# Patient Record
Sex: MALE | Race: WHITE | NOT HISPANIC OR LATINO
[De-identification: names, ages, dates, MRNs, and addresses within clinical notes are randomized per-mention and may not be internally consistent; named-entity substitution may affect disease eponyms.]

---

## 2017-05-10 ENCOUNTER — APPOINTMENT (OUTPATIENT)
Dept: COLORECTAL SURGERY | Facility: CLINIC | Age: 27
End: 2017-05-10

## 2017-05-10 VITALS
HEIGHT: 68 IN | SYSTOLIC BLOOD PRESSURE: 137 MMHG | HEART RATE: 87 BPM | WEIGHT: 180 LBS | RESPIRATION RATE: 14 BRPM | BODY MASS INDEX: 27.28 KG/M2 | DIASTOLIC BLOOD PRESSURE: 83 MMHG | TEMPERATURE: 98.1 F

## 2017-05-10 DIAGNOSIS — Z86.69 PERSONAL HISTORY OF OTHER DISEASES OF THE NERVOUS SYSTEM AND SENSE ORGANS: ICD-10-CM

## 2017-05-10 DIAGNOSIS — Z87.2 PERSONAL HISTORY OF DISEASES OF THE SKIN AND SUBCUTANEOUS TISSUE: ICD-10-CM

## 2017-05-10 DIAGNOSIS — Z80.9 FAMILY HISTORY OF MALIGNANT NEOPLASM, UNSPECIFIED: ICD-10-CM

## 2017-05-10 DIAGNOSIS — K60.2 ANAL FISSURE, UNSPECIFIED: ICD-10-CM

## 2017-05-10 DIAGNOSIS — Z87.891 PERSONAL HISTORY OF NICOTINE DEPENDENCE: ICD-10-CM

## 2017-05-10 RX ORDER — NITROGLYCERIN 4 MG/G
0.4 OINTMENT RECTAL
Refills: 0 | Status: ACTIVE | COMMUNITY

## 2017-07-18 ENCOUNTER — APPOINTMENT (OUTPATIENT)
Dept: COLORECTAL SURGERY | Facility: HOSPITAL | Age: 27
End: 2017-07-18

## 2017-10-06 ENCOUNTER — OUTPATIENT (OUTPATIENT)
Dept: OUTPATIENT SERVICES | Facility: HOSPITAL | Age: 27
LOS: 1 days | End: 2017-10-06
Payer: COMMERCIAL

## 2017-10-06 VITALS
RESPIRATION RATE: 16 BRPM | HEIGHT: 67 IN | HEART RATE: 66 BPM | OXYGEN SATURATION: 97 % | SYSTOLIC BLOOD PRESSURE: 101 MMHG | TEMPERATURE: 99 F | DIASTOLIC BLOOD PRESSURE: 64 MMHG | WEIGHT: 177.91 LBS

## 2017-10-06 DIAGNOSIS — Z01.818 ENCOUNTER FOR OTHER PREPROCEDURAL EXAMINATION: ICD-10-CM

## 2017-10-06 DIAGNOSIS — K60.0 ACUTE ANAL FISSURE: ICD-10-CM

## 2017-10-06 PROCEDURE — 85027 COMPLETE CBC AUTOMATED: CPT

## 2017-10-06 PROCEDURE — G0463: CPT

## 2017-10-06 RX ORDER — SODIUM CHLORIDE 9 MG/ML
3 INJECTION INTRAMUSCULAR; INTRAVENOUS; SUBCUTANEOUS EVERY 8 HOURS
Qty: 0 | Refills: 0 | Status: DISCONTINUED | OUTPATIENT
Start: 2017-10-12 | End: 2017-10-27

## 2017-10-06 RX ORDER — LIDOCAINE HCL 20 MG/ML
0.2 VIAL (ML) INJECTION ONCE
Qty: 0 | Refills: 0 | Status: DISCONTINUED | OUTPATIENT
Start: 2017-10-12 | End: 2017-10-27

## 2017-10-06 NOTE — H&P PST ADULT - NSANTHOSAYNRD_GEN_A_CORE
No. NAA screening performed.  STOP BANG Legend: 0-2 = LOW Risk; 3-4 = INTERMEDIATE Risk; 5-8 = HIGH Risk

## 2017-10-06 NOTE — H&P PST ADULT - PMH
Acute anal fissure    Herpes zoster without complication  dx 9/2017  antivirals 9/29/17-10/6/17  Hyperlipidemia, unspecified hyperlipidemia type  diet management

## 2017-10-06 NOTE — H&P PST ADULT - HISTORY OF PRESENT ILLNESS
26 yr old male with history of anal fissure for surgery.  ****Was diagnosed with Shingles treated with Valtrex last dose 10/6/17.   Dry crusted lesions left abdomen. ****

## 2017-10-06 NOTE — H&P PST ADULT - FAMILY HISTORY
Father  Still living? No  Family history of early CAD, Age at diagnosis: 41-50     Grandparent  Still living? No  Family history of early CAD, Age at diagnosis: 51-60

## 2017-10-12 ENCOUNTER — TRANSCRIPTION ENCOUNTER (OUTPATIENT)
Age: 27
End: 2017-10-12

## 2017-10-12 ENCOUNTER — OUTPATIENT (OUTPATIENT)
Dept: OUTPATIENT SERVICES | Facility: HOSPITAL | Age: 27
LOS: 1 days | End: 2017-10-12
Payer: COMMERCIAL

## 2017-10-12 ENCOUNTER — RESULT REVIEW (OUTPATIENT)
Age: 27
End: 2017-10-12

## 2017-10-12 VITALS
OXYGEN SATURATION: 100 % | RESPIRATION RATE: 18 BRPM | DIASTOLIC BLOOD PRESSURE: 66 MMHG | HEIGHT: 67 IN | SYSTOLIC BLOOD PRESSURE: 112 MMHG | WEIGHT: 177.91 LBS | TEMPERATURE: 98 F | HEART RATE: 54 BPM

## 2017-10-12 VITALS
RESPIRATION RATE: 18 BRPM | TEMPERATURE: 99 F | OXYGEN SATURATION: 100 % | SYSTOLIC BLOOD PRESSURE: 120 MMHG | DIASTOLIC BLOOD PRESSURE: 66 MMHG | HEART RATE: 57 BPM

## 2017-10-12 DIAGNOSIS — K60.0 ACUTE ANAL FISSURE: ICD-10-CM

## 2017-10-12 PROCEDURE — 82962 GLUCOSE BLOOD TEST: CPT

## 2017-10-12 PROCEDURE — 88304 TISSUE EXAM BY PATHOLOGIST: CPT | Mod: 26

## 2017-10-12 PROCEDURE — 46200 REMOVAL OF ANAL FISSURE: CPT

## 2017-10-12 PROCEDURE — C1889: CPT

## 2017-10-12 PROCEDURE — 88304 TISSUE EXAM BY PATHOLOGIST: CPT

## 2017-10-12 RX ORDER — CELECOXIB 200 MG/1
200 CAPSULE ORAL ONCE
Qty: 0 | Refills: 0 | Status: COMPLETED | OUTPATIENT
Start: 2017-10-12 | End: 2017-10-12

## 2017-10-12 RX ORDER — SODIUM CHLORIDE 9 MG/ML
1000 INJECTION, SOLUTION INTRAVENOUS
Qty: 0 | Refills: 0 | Status: DISCONTINUED | OUTPATIENT
Start: 2017-10-12 | End: 2017-10-27

## 2017-10-12 RX ORDER — ACETAMINOPHEN 500 MG
1000 TABLET ORAL ONCE
Qty: 0 | Refills: 0 | Status: COMPLETED | OUTPATIENT
Start: 2017-10-12 | End: 2017-10-12

## 2017-10-12 RX ORDER — ACETAMINOPHEN 500 MG
975 TABLET ORAL ONCE
Qty: 0 | Refills: 0 | Status: COMPLETED | OUTPATIENT
Start: 2017-10-12 | End: 2017-10-12

## 2017-10-12 RX ORDER — ONDANSETRON 8 MG/1
4 TABLET, FILM COATED ORAL ONCE
Qty: 0 | Refills: 0 | Status: DISCONTINUED | OUTPATIENT
Start: 2017-10-12 | End: 2017-10-27

## 2017-10-12 RX ADMIN — Medication 1000 MILLIGRAM(S): at 18:07

## 2017-10-12 RX ADMIN — Medication 400 MILLIGRAM(S): at 17:49

## 2017-10-12 RX ADMIN — CELECOXIB 200 MILLIGRAM(S): 200 CAPSULE ORAL at 17:49

## 2017-10-12 RX ADMIN — CELECOXIB 200 MILLIGRAM(S): 200 CAPSULE ORAL at 18:07

## 2017-10-12 NOTE — ASU DISCHARGE PLAN (ADULT/PEDIATRIC). - MEDICATION SUMMARY - MEDICATIONS TO TAKE
I will START or STAY ON the medications listed below when I get home from the hospital:    Percocet 5/325 oral tablet  -- 1 tab(s) by mouth every 6 hours  -- Indication: For For pain    ibuprofen 600 mg oral tablet  -- 1 tab(s) by mouth 3 times, As Needed  -- Indication: For For pain    ibuprofen 800 mg oral tablet  -- 1 tab(s) by mouth 3 times a day, As Needed  -- Indication: For For pain    Valtrex 500 mg oral tablet  -- 1 tab(s) by mouth every 12 hours  -- Indication: For Home medication    Pepcid 20 mg oral tablet  -- 1 tab(s) by mouth twice  -- Indication: For Home medication    MiraLax oral powder for reconstitution  -- 1 packet(s) by mouth once a day  -- Indication: For Home medication    Benefiber oral powder for reconstitution  -- 1 packet(s) by mouth once a day  -- Indication: For Home medication

## 2017-10-12 NOTE — ASU PREOP CHECKLIST - COMMENTS
Pepcid @1100 with iced tea 8 ounces and miralax today Percocet and Pepcid @1100 with iced tea 8 ounces and miralax today

## 2017-10-12 NOTE — BRIEF OPERATIVE NOTE - PROCEDURE
<<-----Click on this checkbox to enter Procedure Examination under anesthesia  10/12/2017    Active  FRANDY  Botox injection  10/12/2017    Active  FRANDY  Fissurotomy of anus  10/12/2017    Active  FRANDY

## 2017-10-12 NOTE — PRE-ANESTHESIA EVALUATION ADULT - NSANTHPEFT_GEN_ALL_CORE
appeared diaphoretic during initial presentation - patient attributes to being NPO  he also took percocet earlier today which he usually doesn't take  he has dilated pupils - he denies any drug use and says they are always dilated

## 2017-10-12 NOTE — PACU DISCHARGE NOTE - COMMENTS
patient demonstrated a sinus arrhythmia pre, intra and post op  when discussing it in PACU, the patient's girlfriend was already aware of the arrhythmia   His BP is normal; HR is john 40's-50's which is his baseline  he has no CP, SOB or dizziness  Discussed the findings with the patient, girlfriend and parents - will follow up with his PMD

## 2017-10-12 NOTE — ASU DISCHARGE PLAN (ADULT/PEDIATRIC). - NOTIFY
Pain not relieved by Medications/Fever greater than 101/Inability to Tolerate Liquids or Foods/Bleeding that does not stop Fever greater than 101/Unable to Urinate/Swelling that continues/Pain not relieved by Medications/Persistent Nausea and Vomiting/Inability to Tolerate Liquids or Foods/Bleeding that does not stop

## 2017-10-30 ENCOUNTER — TRANSCRIPTION ENCOUNTER (OUTPATIENT)
Age: 27
End: 2017-10-30

## 2017-11-01 ENCOUNTER — EMERGENCY (EMERGENCY)
Facility: HOSPITAL | Age: 27
LOS: 1 days | Discharge: HOME | End: 2017-11-01
Attending: EMERGENCY MEDICINE | Admitting: EMERGENCY MEDICINE
Payer: COMMERCIAL

## 2017-11-01 VITALS
DIASTOLIC BLOOD PRESSURE: 68 MMHG | SYSTOLIC BLOOD PRESSURE: 113 MMHG | OXYGEN SATURATION: 99 % | RESPIRATION RATE: 14 BRPM | TEMPERATURE: 99 F | HEART RATE: 60 BPM

## 2017-11-01 VITALS
WEIGHT: 108.03 LBS | TEMPERATURE: 98 F | HEART RATE: 78 BPM | RESPIRATION RATE: 16 BRPM | DIASTOLIC BLOOD PRESSURE: 83 MMHG | HEIGHT: 67 IN | SYSTOLIC BLOOD PRESSURE: 145 MMHG | OXYGEN SATURATION: 99 %

## 2017-11-01 DIAGNOSIS — K60.2 ANAL FISSURE, UNSPECIFIED: Chronic | ICD-10-CM

## 2017-11-01 LAB
ALBUMIN SERPL ELPH-MCNC: 4.1 G/DL — SIGNIFICANT CHANGE UP (ref 3.3–5)
ALP SERPL-CCNC: 63 U/L — SIGNIFICANT CHANGE UP (ref 40–120)
ALT FLD-CCNC: 21 U/L — SIGNIFICANT CHANGE UP (ref 12–78)
ANION GAP SERPL CALC-SCNC: 9 MMOL/L — SIGNIFICANT CHANGE UP (ref 5–17)
APTT BLD: 28.3 SEC — SIGNIFICANT CHANGE UP (ref 27.5–37.4)
AST SERPL-CCNC: 12 U/L — LOW (ref 15–37)
BASOPHILS # BLD AUTO: 0.1 K/UL — SIGNIFICANT CHANGE UP (ref 0–0.2)
BASOPHILS NFR BLD AUTO: 0.7 % — SIGNIFICANT CHANGE UP (ref 0–2)
BILIRUB SERPL-MCNC: 0.4 MG/DL — SIGNIFICANT CHANGE UP (ref 0.2–1.2)
BUN SERPL-MCNC: 13 MG/DL — SIGNIFICANT CHANGE UP (ref 7–23)
CALCIUM SERPL-MCNC: 9.6 MG/DL — SIGNIFICANT CHANGE UP (ref 8.5–10.1)
CHLORIDE SERPL-SCNC: 102 MMOL/L — SIGNIFICANT CHANGE UP (ref 96–108)
CO2 SERPL-SCNC: 27 MMOL/L — SIGNIFICANT CHANGE UP (ref 22–31)
CREAT SERPL-MCNC: 1.1 MG/DL — SIGNIFICANT CHANGE UP (ref 0.5–1.3)
EOSINOPHIL # BLD AUTO: 0.1 K/UL — SIGNIFICANT CHANGE UP (ref 0–0.5)
EOSINOPHIL NFR BLD AUTO: 0.6 % — SIGNIFICANT CHANGE UP (ref 0–6)
GLUCOSE SERPL-MCNC: 117 MG/DL — HIGH (ref 70–99)
HCT VFR BLD CALC: 42.2 % — SIGNIFICANT CHANGE UP (ref 39–50)
HGB BLD-MCNC: 13.9 G/DL — SIGNIFICANT CHANGE UP (ref 13–17)
INR BLD: 1.04 RATIO — SIGNIFICANT CHANGE UP (ref 0.88–1.16)
LYMPHOCYTES # BLD AUTO: 1.9 K/UL — SIGNIFICANT CHANGE UP (ref 1–3.3)
LYMPHOCYTES # BLD AUTO: 16.4 % — SIGNIFICANT CHANGE UP (ref 13–44)
MCHC RBC-ENTMCNC: 28.5 PG — SIGNIFICANT CHANGE UP (ref 27–34)
MCHC RBC-ENTMCNC: 33 GM/DL — SIGNIFICANT CHANGE UP (ref 32–36)
MCV RBC AUTO: 86.6 FL — SIGNIFICANT CHANGE UP (ref 80–100)
MONOCYTES # BLD AUTO: 0.6 K/UL — SIGNIFICANT CHANGE UP (ref 0–0.9)
MONOCYTES NFR BLD AUTO: 5.5 % — SIGNIFICANT CHANGE UP (ref 1–9)
NEUTROPHILS # BLD AUTO: 8.7 K/UL — HIGH (ref 1.8–7.4)
NEUTROPHILS NFR BLD AUTO: 76.8 % — SIGNIFICANT CHANGE UP (ref 43–77)
PCP SPEC-MCNC: SIGNIFICANT CHANGE UP
PLATELET # BLD AUTO: 298 K/UL — SIGNIFICANT CHANGE UP (ref 150–400)
POTASSIUM SERPL-MCNC: 3.9 MMOL/L — SIGNIFICANT CHANGE UP (ref 3.5–5.3)
POTASSIUM SERPL-SCNC: 3.9 MMOL/L — SIGNIFICANT CHANGE UP (ref 3.5–5.3)
PROT SERPL-MCNC: 7.8 G/DL — SIGNIFICANT CHANGE UP (ref 6–8.3)
PROTHROM AB SERPL-ACNC: 11.4 SEC — SIGNIFICANT CHANGE UP (ref 9.8–12.7)
RBC # BLD: 4.88 M/UL — SIGNIFICANT CHANGE UP (ref 4.2–5.8)
RBC # FLD: 11.8 % — SIGNIFICANT CHANGE UP (ref 10.3–14.5)
SODIUM SERPL-SCNC: 138 MMOL/L — SIGNIFICANT CHANGE UP (ref 135–145)
WBC # BLD: 11.3 K/UL — HIGH (ref 3.8–10.5)
WBC # FLD AUTO: 11.3 K/UL — HIGH (ref 3.8–10.5)

## 2017-11-01 PROCEDURE — 85730 THROMBOPLASTIN TIME PARTIAL: CPT

## 2017-11-01 PROCEDURE — 36415 COLL VENOUS BLD VENIPUNCTURE: CPT

## 2017-11-01 PROCEDURE — 80053 COMPREHEN METABOLIC PANEL: CPT

## 2017-11-01 PROCEDURE — 70450 CT HEAD/BRAIN W/O DYE: CPT | Mod: 26

## 2017-11-01 PROCEDURE — 85027 COMPLETE CBC AUTOMATED: CPT

## 2017-11-01 PROCEDURE — 93005 ELECTROCARDIOGRAM TRACING: CPT

## 2017-11-01 PROCEDURE — 85610 PROTHROMBIN TIME: CPT

## 2017-11-01 PROCEDURE — 99284 EMERGENCY DEPT VISIT MOD MDM: CPT | Mod: 25

## 2017-11-01 PROCEDURE — 80307 DRUG TEST PRSMV CHEM ANLYZR: CPT

## 2017-11-01 PROCEDURE — 99285 EMERGENCY DEPT VISIT HI MDM: CPT

## 2017-11-01 PROCEDURE — 70450 CT HEAD/BRAIN W/O DYE: CPT

## 2017-11-01 RX ORDER — WHEAT DEXTRIN 3 G/4 G
1 POWDER IN PACKET (EA) ORAL
Qty: 0 | Refills: 0 | COMMUNITY

## 2017-11-01 RX ORDER — ACETAMINOPHEN 500 MG
650 TABLET ORAL ONCE
Qty: 0 | Refills: 0 | Status: COMPLETED | OUTPATIENT
Start: 2017-11-01 | End: 2017-11-01

## 2017-11-01 RX ORDER — IBUPROFEN 200 MG
1 TABLET ORAL
Qty: 0 | Refills: 0 | COMMUNITY

## 2017-11-01 RX ORDER — VALACYCLOVIR 500 MG/1
1 TABLET, FILM COATED ORAL
Qty: 0 | Refills: 0 | COMMUNITY

## 2017-11-01 RX ORDER — POLYETHYLENE GLYCOL 3350 17 G/17G
1 POWDER, FOR SOLUTION ORAL
Qty: 0 | Refills: 0 | COMMUNITY

## 2017-11-01 RX ORDER — FAMOTIDINE 10 MG/ML
1 INJECTION INTRAVENOUS
Qty: 0 | Refills: 0 | COMMUNITY

## 2017-11-01 RX ADMIN — Medication 650 MILLIGRAM(S): at 16:39

## 2017-11-01 NOTE — ED PROVIDER NOTE - PHYSICAL EXAMINATION
Gen:  alert, awake, no acute distress  HEENT:  atraumatic head, airway clear, pupils equal and round  CV:  rrr, nl S1, S2, no m/r/g  Pulm:  BS equal b/l  Abd: s/nt/nd, +BS  Ext:  VO  Neuro:  grossly intact, no deficits  Skin:  clear, dry, intact, abrasion to L forehead

## 2017-11-01 NOTE — ED PROVIDER NOTE - OBJECTIVE STATEMENT
sudden LOC witnessed by wife which appeared to her to be a seizure, pt was noted to be rigid and foaming at the mouth but then resolved within 30-60 seconds and patient was lucid almost immediately thereafter, no post-ictal state was noted.  pt +head trauma against nearby wall.  pt now feels back to normal.  pt has had increased stress from work as a , decreased sleep.

## 2017-11-01 NOTE — ED ADULT TRIAGE NOTE - CHIEF COMPLAINT QUOTE
had seizure @ 1245 x 1 min  refused ambulance due to possible cost had seizure @ 1245 x 1 min  refused ambulance due to possible cost  hematoma right forehead

## 2017-11-01 NOTE — ED ADULT NURSE NOTE - OBJECTIVE STATEMENT
Pt A&Ox4, ambulatory to ED stating he had a seizure earlier.  Pt denies seizure history.  Per girlfriend at bedside, she states she was in the bathroom and heard pt "grunting" then saw him rolling off of couch with arms "contracted" and "foaming at the mouth."  Pt hit head against the wall.  Girlfriend states seizure lasted "about a minute" and that he "was out of it" for a little while after.  Pt has hematoma and redness to right forehead, denies urinary or bowel incontinence.

## 2017-11-29 ENCOUNTER — APPOINTMENT (OUTPATIENT)
Dept: MRI IMAGING | Facility: CLINIC | Age: 27
End: 2017-11-29

## 2018-03-27 ENCOUNTER — APPOINTMENT (OUTPATIENT)
Dept: MRI IMAGING | Facility: CLINIC | Age: 28
End: 2018-03-27

## 2018-06-04 ENCOUNTER — RECORD ABSTRACTING (OUTPATIENT)
Age: 28
End: 2018-06-04

## 2018-06-04 DIAGNOSIS — E55.9 VITAMIN D DEFICIENCY, UNSPECIFIED: ICD-10-CM

## 2018-06-04 DIAGNOSIS — Z83.42 FAMILY HISTORY OF FAMILIAL HYPERCHOLESTEROLEMIA: ICD-10-CM

## 2018-06-04 DIAGNOSIS — J31.0 CHRONIC RHINITIS: ICD-10-CM

## 2018-06-04 DIAGNOSIS — E78.00 PURE HYPERCHOLESTEROLEMIA, UNSPECIFIED: ICD-10-CM

## 2018-06-04 DIAGNOSIS — G43.909 MIGRAINE, UNSPECIFIED, NOT INTRACTABLE, W/OUT STATUS MIGRAINOSUS: ICD-10-CM

## 2018-06-04 DIAGNOSIS — T78.40XA ALLERGY, UNSPECIFIED, INITIAL ENCOUNTER: ICD-10-CM

## 2018-06-04 DIAGNOSIS — J45.909 UNSPECIFIED ASTHMA, UNCOMPLICATED: ICD-10-CM

## 2018-06-04 DIAGNOSIS — K59.00 CONSTIPATION, UNSPECIFIED: ICD-10-CM

## 2018-06-04 DIAGNOSIS — Z86.59 PERSONAL HISTORY OF OTHER MENTAL AND BEHAVIORAL DISORDERS: ICD-10-CM

## 2018-06-04 RX ORDER — IPRATROPIUM BROMIDE 21 UG/1
0.03 SPRAY NASAL 3 TIMES DAILY
Refills: 0 | Status: ACTIVE | COMMUNITY

## 2018-06-04 RX ORDER — UBIDECARENONE/VIT E ACET 100MG-5
50 MCG CAPSULE ORAL
Refills: 0 | Status: ACTIVE | COMMUNITY

## 2018-06-04 RX ORDER — BUTALBITAL, ACETAMINOPHEN, AND CAFFEINE 50; 300; 40 MG/1; MG/1; MG/1
50-300-40 CAPSULE ORAL
Refills: 0 | Status: ACTIVE | COMMUNITY

## 2018-06-04 RX ORDER — PSYLLIUM HUSK 0.4 G
CAPSULE ORAL
Refills: 0 | Status: ACTIVE | COMMUNITY

## 2018-06-04 RX ORDER — MULTIVITAMIN
TABLET ORAL
Refills: 0 | Status: ACTIVE | COMMUNITY

## 2018-06-05 ENCOUNTER — APPOINTMENT (OUTPATIENT)
Age: 28
End: 2018-06-05
Payer: COMMERCIAL

## 2018-06-05 VITALS
TEMPERATURE: 99 F | HEIGHT: 68 IN | SYSTOLIC BLOOD PRESSURE: 126 MMHG | BODY MASS INDEX: 28.49 KG/M2 | WEIGHT: 188 LBS | DIASTOLIC BLOOD PRESSURE: 72 MMHG

## 2018-06-05 DIAGNOSIS — F11.10 OPIOID ABUSE, UNCOMPLICATED: ICD-10-CM

## 2018-06-05 DIAGNOSIS — F11.20 OPIOID DEPENDENCE, UNCOMPLICATED: ICD-10-CM

## 2018-06-05 PROCEDURE — 99214 OFFICE O/P EST MOD 30 MIN: CPT

## 2018-06-05 NOTE — PHYSICAL EXAM
[No Acute Distress] : no acute distress [No Respiratory Distress] : no respiratory distress  [Clear to Auscultation] : lungs were clear to auscultation bilaterally [No Accessory Muscle Use] : no accessory muscle use [Normal Rate] : normal rate  [Regular Rhythm] : with a regular rhythm [Normal S1, S2] : normal S1 and S2

## 2018-06-05 NOTE — HISTORY OF PRESENT ILLNESS
[FreeTextEntry8] : Pt c/o sinus pressure and congestion, PND, cough, H/A, feels feverish and experiencing cold sweats x 3/4 days. Tried OTC meds and nothing is helping.\par \par Patient advised above to the medical assistance because he did not want to disclose when he was here for. \par \par Patient has been treated for colorectal fissures in 2017. He had a colorectal surgery November 2017. For the last 2 years patient has been treated with oxycodone pain medication. He is here in the office today to discuss his dependency with this medication. He is currently taking 200 mg of oxycodone daily. He is only been prescribed by a practitioner and is getting this medication on the street. History right detox himself but was not able to secondary to side effects. He is asking for help today and information on detox. He would like to start Suboxone treatment.

## 2018-06-05 NOTE — PLAN
[FreeTextEntry1] : I just discussed Suboxone and treatment the patient in depth. However I advised patient that I am not able to prescribe Suboxone therapy. Patient is here in the office for willing and wanting to start therapy. I personally called over to several pain management offices to try to get this patient emergency visit. He is taking then his information and will be contacting him shortly. I advised patient not to return back to work today to further seek some help as he is ready to start treatment. Patient is advised to call me at any point to further help with this.

## 2019-01-22 PROBLEM — B02.9 ZOSTER WITHOUT COMPLICATIONS: Chronic | Status: ACTIVE | Noted: 2017-10-06

## 2019-01-22 PROBLEM — K60.0 ACUTE ANAL FISSURE: Chronic | Status: ACTIVE | Noted: 2017-10-06

## 2019-01-22 PROBLEM — E78.5 HYPERLIPIDEMIA, UNSPECIFIED: Chronic | Status: ACTIVE | Noted: 2017-10-06

## 2019-02-01 ENCOUNTER — APPOINTMENT (OUTPATIENT)
Dept: INTERNAL MEDICINE | Facility: CLINIC | Age: 29
End: 2019-02-01
Payer: COMMERCIAL

## 2019-02-01 VITALS
BODY MASS INDEX: 27.74 KG/M2 | DIASTOLIC BLOOD PRESSURE: 70 MMHG | HEIGHT: 68 IN | WEIGHT: 183 LBS | SYSTOLIC BLOOD PRESSURE: 110 MMHG | TEMPERATURE: 97.5 F

## 2019-02-01 DIAGNOSIS — F51.02 ADJUSTMENT INSOMNIA: ICD-10-CM

## 2019-02-01 DIAGNOSIS — F41.8 OTHER SPECIFIED ANXIETY DISORDERS: ICD-10-CM

## 2019-02-01 PROCEDURE — 99214 OFFICE O/P EST MOD 30 MIN: CPT

## 2019-02-01 NOTE — PLAN
[FreeTextEntry1] : Discussed medications in depth with patient\par Patient will start medications given above and followup with me in 3-4 weeks or before then if needed

## 2019-02-01 NOTE — HISTORY OF PRESENT ILLNESS
[FreeTextEntry8] : 29 y/o male present with complaints of stomach pain for weeks \par The patient presents to the office today with increased anxiety and insomnia for last few months.\par He is currently being treated Suboxone clinic for his opiate abuse and has not used any opiates in 2 months\par Since stopping opiates he has noticed increased anxiety and depression no thoughts of suicide hurting himself or others

## 2019-02-01 NOTE — PHYSICAL EXAM
[No Acute Distress] : no acute distress [Well Nourished] : well nourished [Soft] : abdomen soft [Non Tender] : non-tender [Non-distended] : non-distended [No Masses] : no abdominal mass palpated [Normal Bowel Sounds] : normal bowel sounds [Normal Affect] : the affect was normal [Normal Insight/Judgement] : insight and judgment were intact

## 2019-02-01 NOTE — REVIEW OF SYSTEMS
[Nausea] : nausea [Diarrhea] : diarrhea [Negative] : Respiratory [FreeTextEntry7] : Intermittenusea and diarrhea

## 2019-02-03 PROBLEM — T78.40XA ALLERGY: Status: ACTIVE | Noted: 2018-06-04

## 2019-03-01 ENCOUNTER — RX RENEWAL (OUTPATIENT)
Age: 29
End: 2019-03-01

## 2019-03-01 ENCOUNTER — CHART COPY (OUTPATIENT)
Age: 29
End: 2019-03-01

## 2019-05-06 ENCOUNTER — RX RENEWAL (OUTPATIENT)
Age: 29
End: 2019-05-06

## 2019-05-07 ENCOUNTER — APPOINTMENT (OUTPATIENT)
Dept: INTERNAL MEDICINE | Facility: CLINIC | Age: 29
End: 2019-05-07

## 2019-07-08 ENCOUNTER — RX RENEWAL (OUTPATIENT)
Age: 29
End: 2019-07-08

## 2019-08-27 ENCOUNTER — RX RENEWAL (OUTPATIENT)
Age: 29
End: 2019-08-27

## 2019-08-27 RX ORDER — SERTRALINE HYDROCHLORIDE 50 MG/1
50 TABLET, FILM COATED ORAL
Qty: 30 | Refills: 0 | Status: ACTIVE | COMMUNITY
Start: 2019-02-01 | End: 1900-01-01

## 2019-10-24 ENCOUNTER — RX RENEWAL (OUTPATIENT)
Age: 29
End: 2019-10-24

## 2019-10-25 RX ORDER — TRAZODONE HYDROCHLORIDE 50 MG/1
50 TABLET ORAL
Qty: 180 | Refills: 0 | Status: ACTIVE | COMMUNITY
Start: 2019-02-01 | End: 1900-01-01

## 2019-11-13 ENCOUNTER — RX RENEWAL (OUTPATIENT)
Age: 29
End: 2019-11-13

## 2022-03-16 ENCOUNTER — APPOINTMENT (OUTPATIENT)
Dept: INTERNAL MEDICINE | Facility: CLINIC | Age: 32
End: 2022-03-16
Payer: SELF-PAY

## 2022-03-16 VITALS
HEART RATE: 123 BPM | OXYGEN SATURATION: 96 % | WEIGHT: 190 LBS | DIASTOLIC BLOOD PRESSURE: 80 MMHG | TEMPERATURE: 98.8 F | BODY MASS INDEX: 28.79 KG/M2 | HEIGHT: 68 IN | SYSTOLIC BLOOD PRESSURE: 120 MMHG

## 2022-03-16 DIAGNOSIS — N52.9 MALE ERECTILE DYSFUNCTION, UNSPECIFIED: ICD-10-CM

## 2022-03-16 PROCEDURE — 99213 OFFICE O/P EST LOW 20 MIN: CPT

## 2022-03-16 NOTE — HISTORY OF PRESENT ILLNESS
[FreeTextEntry8] : Pt presents to the office today for consult on ED\par He is very anxious about being able to get erection when away with girlfriend.  She had a baby 8 months ago and they havent been having relations and would like to try medication for ED

## 2022-04-14 ENCOUNTER — APPOINTMENT (OUTPATIENT)
Dept: INTERNAL MEDICINE | Facility: CLINIC | Age: 32
End: 2022-04-14

## 2022-05-31 ENCOUNTER — RX RENEWAL (OUTPATIENT)
Age: 32
End: 2022-05-31

## 2022-07-26 ENCOUNTER — RX RENEWAL (OUTPATIENT)
Age: 32
End: 2022-07-26

## 2022-07-26 RX ORDER — SILDENAFIL 100 MG/1
100 TABLET, FILM COATED ORAL
Qty: 30 | Refills: 0 | Status: ACTIVE | COMMUNITY
Start: 2022-03-16 | End: 1900-01-01

## 2022-08-03 ENCOUNTER — APPOINTMENT (OUTPATIENT)
Dept: INTERNAL MEDICINE | Facility: CLINIC | Age: 32
End: 2022-08-03

## 2023-03-26 VITALS
DIASTOLIC BLOOD PRESSURE: 74 MMHG | SYSTOLIC BLOOD PRESSURE: 125 MMHG | TEMPERATURE: 99 F | OXYGEN SATURATION: 94 % | RESPIRATION RATE: 19 BRPM | HEART RATE: 85 BPM | HEIGHT: 67 IN | WEIGHT: 160.06 LBS

## 2023-03-26 LAB
ALBUMIN SERPL ELPH-MCNC: 1.9 G/DL — LOW (ref 3.4–5)
ALP SERPL-CCNC: 118 U/L — SIGNIFICANT CHANGE UP (ref 40–120)
ALT FLD-CCNC: 50 U/L — HIGH (ref 12–42)
AMPHET UR-MCNC: NEGATIVE — SIGNIFICANT CHANGE UP
ANION GAP SERPL CALC-SCNC: 2 MMOL/L — LOW (ref 9–16)
APPEARANCE UR: CLEAR — SIGNIFICANT CHANGE UP
APTT BLD: 29.9 SEC — SIGNIFICANT CHANGE UP (ref 27.5–35.5)
ASO AB SER QL: 1239 IU/ML — HIGH (ref 0–199)
AST SERPL-CCNC: 33 U/L — SIGNIFICANT CHANGE UP (ref 15–37)
BACTERIA # UR AUTO: PRESENT /HPF
BARBITURATES UR SCN-MCNC: NEGATIVE — SIGNIFICANT CHANGE UP
BENZODIAZ UR-MCNC: NEGATIVE — SIGNIFICANT CHANGE UP
BILIRUB SERPL-MCNC: 0.2 MG/DL — SIGNIFICANT CHANGE UP (ref 0.2–1.2)
BILIRUB UR-MCNC: NEGATIVE — SIGNIFICANT CHANGE UP
BUN SERPL-MCNC: 24 MG/DL — HIGH (ref 7–23)
CALCIUM SERPL-MCNC: 8.3 MG/DL — LOW (ref 8.5–10.5)
CHLORIDE SERPL-SCNC: 108 MMOL/L — SIGNIFICANT CHANGE UP (ref 96–108)
CK MB CFR SERPL CALC: 4.6 NG/ML — HIGH (ref 0.5–3.6)
CK SERPL-CCNC: 145 U/L — SIGNIFICANT CHANGE UP (ref 39–308)
CO2 SERPL-SCNC: 30 MMOL/L — SIGNIFICANT CHANGE UP (ref 22–31)
COCAINE METAB.OTHER UR-MCNC: POSITIVE
COLOR SPEC: YELLOW — SIGNIFICANT CHANGE UP
CREAT SERPL-MCNC: 1.32 MG/DL — HIGH (ref 0.5–1.3)
CRP SERPL-MCNC: 14 MG/L — HIGH (ref 0–9)
D DIMER BLD IA.RAPID-MCNC: 894 NG/ML DDU — HIGH
DIFF PNL FLD: ABNORMAL
EGFR: 73 ML/MIN/1.73M2 — SIGNIFICANT CHANGE UP
EPI CELLS # UR: ABNORMAL /HPF (ref 0–5)
ERYTHROCYTE [SEDIMENTATION RATE] IN BLOOD: 76 MM/HR — HIGH (ref 0–15)
FLUAV AG NPH QL: SIGNIFICANT CHANGE UP
FLUAV H1 2009 PAND RNA SPEC QL NAA+PROBE: SIGNIFICANT CHANGE UP
FLUAV H1 RNA SPEC QL NAA+PROBE: SIGNIFICANT CHANGE UP
FLUAV H3 RNA SPEC QL NAA+PROBE: SIGNIFICANT CHANGE UP
FLUAV SUBTYP SPEC NAA+PROBE: SIGNIFICANT CHANGE UP
FLUBV AG NPH QL: SIGNIFICANT CHANGE UP
FLUBV RNA SPEC QL NAA+PROBE: SIGNIFICANT CHANGE UP
GLUCOSE SERPL-MCNC: 113 MG/DL — HIGH (ref 70–99)
GLUCOSE UR QL: NEGATIVE — SIGNIFICANT CHANGE UP
HCT VFR BLD CALC: 31.4 % — LOW (ref 39–50)
HGB BLD-MCNC: 10.2 G/DL — LOW (ref 13–17)
HIV 1 & 2 AB SERPL IA.RAPID: SIGNIFICANT CHANGE UP
HYALINE CASTS # UR AUTO: ABNORMAL /LPF (ref 0–2)
INR BLD: 1.08 — SIGNIFICANT CHANGE UP (ref 0.88–1.16)
KETONES UR-MCNC: NEGATIVE — SIGNIFICANT CHANGE UP
LACTATE SERPL-SCNC: 1.1 MMOL/L — SIGNIFICANT CHANGE UP (ref 0.4–2)
LACTATE SERPL-SCNC: 1.6 MMOL/L — SIGNIFICANT CHANGE UP (ref 0.4–2)
LEUKOCYTE ESTERASE UR-ACNC: ABNORMAL
LIDOCAIN IGE QN: 89 U/L — SIGNIFICANT CHANGE UP (ref 73–393)
MCHC RBC-ENTMCNC: 28.4 PG — SIGNIFICANT CHANGE UP (ref 27–34)
MCHC RBC-ENTMCNC: 32.5 GM/DL — SIGNIFICANT CHANGE UP (ref 32–36)
MCV RBC AUTO: 87.5 FL — SIGNIFICANT CHANGE UP (ref 80–100)
METHADONE UR-MCNC: NEGATIVE — SIGNIFICANT CHANGE UP
NITRITE UR-MCNC: NEGATIVE — SIGNIFICANT CHANGE UP
NRBC # BLD: 0 /100 WBCS — SIGNIFICANT CHANGE UP (ref 0–0)
NT-PROBNP SERPL-SCNC: 1722 PG/ML — HIGH
OPIATES UR-MCNC: POSITIVE
PCP SPEC-MCNC: SIGNIFICANT CHANGE UP
PCP UR-MCNC: NEGATIVE — SIGNIFICANT CHANGE UP
PH UR: 6 — SIGNIFICANT CHANGE UP (ref 5–8)
PLATELET # BLD AUTO: 566 K/UL — HIGH (ref 150–400)
POTASSIUM SERPL-MCNC: 4.7 MMOL/L — SIGNIFICANT CHANGE UP (ref 3.5–5.3)
POTASSIUM SERPL-SCNC: 4.7 MMOL/L — SIGNIFICANT CHANGE UP (ref 3.5–5.3)
PROT SERPL-MCNC: 7.2 G/DL — SIGNIFICANT CHANGE UP (ref 6.4–8.2)
PROT UR-MCNC: 100 MG/DL
PROTHROM AB SERPL-ACNC: 12.7 SEC — SIGNIFICANT CHANGE UP (ref 10.5–13.4)
RAPID RVP RESULT: SIGNIFICANT CHANGE UP
RBC # BLD: 3.59 M/UL — LOW (ref 4.2–5.8)
RBC # FLD: 13.2 % — SIGNIFICANT CHANGE UP (ref 10.3–14.5)
RBC CASTS # UR COMP ASSIST: > 10 /HPF
RSV RNA NPH QL NAA+NON-PROBE: SIGNIFICANT CHANGE UP
S PYO AG SPEC QL IA: POSITIVE
SARS-COV-2 RNA SPEC QL NAA+PROBE: SIGNIFICANT CHANGE UP
SARS-COV-2 RNA SPEC QL NAA+PROBE: SIGNIFICANT CHANGE UP
SODIUM SERPL-SCNC: 140 MMOL/L — SIGNIFICANT CHANGE UP (ref 132–145)
SP GR SPEC: 1.02 — SIGNIFICANT CHANGE UP (ref 1–1.03)
THC UR QL: NEGATIVE — SIGNIFICANT CHANGE UP
TROPONIN I, HIGH SENSITIVITY RESULT: 7.2 NG/L — SIGNIFICANT CHANGE UP
TSH SERPL-MCNC: 0.88 UIU/ML — SIGNIFICANT CHANGE UP (ref 0.36–3.74)
UROBILINOGEN FLD QL: 0.2 E.U./DL — SIGNIFICANT CHANGE UP
WBC # BLD: 8.03 K/UL — SIGNIFICANT CHANGE UP (ref 3.8–10.5)
WBC # FLD AUTO: 8.03 K/UL — SIGNIFICANT CHANGE UP (ref 3.8–10.5)
WBC UR QL: ABNORMAL /HPF

## 2023-03-26 PROCEDURE — 74174 CTA ABD&PLVS W/CONTRAST: CPT | Mod: 26

## 2023-03-26 PROCEDURE — 93970 EXTREMITY STUDY: CPT | Mod: 26

## 2023-03-26 PROCEDURE — 71046 X-RAY EXAM CHEST 2 VIEWS: CPT | Mod: 26

## 2023-03-26 PROCEDURE — 71275 CT ANGIOGRAPHY CHEST: CPT | Mod: 26

## 2023-03-26 PROCEDURE — 99285 EMERGENCY DEPT VISIT HI MDM: CPT

## 2023-03-26 RX ORDER — HYDROXYZINE HCL 10 MG
25 TABLET ORAL ONCE
Refills: 0 | Status: COMPLETED | OUTPATIENT
Start: 2023-03-26 | End: 2023-03-26

## 2023-03-26 RX ORDER — SODIUM CHLORIDE 9 MG/ML
1000 INJECTION INTRAMUSCULAR; INTRAVENOUS; SUBCUTANEOUS ONCE
Refills: 0 | Status: COMPLETED | OUTPATIENT
Start: 2023-03-26 | End: 2023-03-26

## 2023-03-26 RX ORDER — AZITHROMYCIN 500 MG/1
500 TABLET, FILM COATED ORAL ONCE
Refills: 0 | Status: COMPLETED | OUTPATIENT
Start: 2023-03-26 | End: 2023-03-26

## 2023-03-26 RX ORDER — CEFTRIAXONE 500 MG/1
1000 INJECTION, POWDER, FOR SOLUTION INTRAMUSCULAR; INTRAVENOUS ONCE
Refills: 0 | Status: COMPLETED | OUTPATIENT
Start: 2023-03-26 | End: 2023-03-26

## 2023-03-26 RX ORDER — DIAZEPAM 5 MG
10 TABLET ORAL ONCE
Refills: 0 | Status: DISCONTINUED | OUTPATIENT
Start: 2023-03-26 | End: 2023-03-26

## 2023-03-26 RX ORDER — DIAZEPAM 5 MG
5 TABLET ORAL ONCE
Refills: 0 | Status: DISCONTINUED | OUTPATIENT
Start: 2023-03-26 | End: 2023-03-26

## 2023-03-26 RX ADMIN — CEFTRIAXONE 100 MILLIGRAM(S): 500 INJECTION, POWDER, FOR SOLUTION INTRAMUSCULAR; INTRAVENOUS at 15:35

## 2023-03-26 RX ADMIN — SODIUM CHLORIDE 1000 MILLILITER(S): 9 INJECTION INTRAMUSCULAR; INTRAVENOUS; SUBCUTANEOUS at 13:04

## 2023-03-26 RX ADMIN — Medication 10 MILLIGRAM(S): at 23:38

## 2023-03-26 RX ADMIN — Medication 25 MILLIGRAM(S): at 18:12

## 2023-03-26 RX ADMIN — Medication 0.1 MILLIGRAM(S): at 18:12

## 2023-03-26 RX ADMIN — Medication 25 MILLIGRAM(S): at 23:38

## 2023-03-26 RX ADMIN — Medication 5 MILLIGRAM(S): at 18:12

## 2023-03-26 RX ADMIN — Medication 0.1 MILLIGRAM(S): at 23:38

## 2023-03-26 RX ADMIN — AZITHROMYCIN 255 MILLIGRAM(S): 500 TABLET, FILM COATED ORAL at 16:06

## 2023-03-26 NOTE — ED PROVIDER NOTE - CLINICAL SUMMARY MEDICAL DECISION MAKING FREE TEXT BOX
33 y/o Male with no PMHx presenting with 2 weeks of chest congestion and cough and 2 days of lower extremity swelling. Will obtain labs, US bilateral legs, chest x-ray, and reassess. 31 y/o Male with no PMHx presenting with 2 weeks of chest congestion and cough and 2 days of lower extremity swelling. Will obtain labs, US bilateral legs, chest x-ray, and reassess.    Strep throat and bilateral pneumonia, anasarca, elevated creatinine.  Case d/w critical attending Dr Marie and will admit to Dr Zweiler medicine.  Antibiotics given and ASLO titer sent.

## 2023-03-26 NOTE — ED ADULT NURSE REASSESSMENT NOTE - NS ED NURSE REASSESS COMMENT FT1
Pt. is AAOx4. Father at bedside. Speaking in full sentences with no use of accessory muscle, or nasal flaring. Pt. denies cp/palpitations, h/a, n/v/d or abd pain. Pending bed availability. See COW score. MD Vila made aware.

## 2023-03-26 NOTE — ED PROVIDER NOTE - OBJECTIVE STATEMENT
33 y/o Male with no PMHx presenting with chest congestion for the past 2 weeks and lower extremity swelling for the past 2 days. Patient states that he has lost feeling of his toes in the last 2 days. Patient also endorses coughing. Denies known allergies to medications. Patient states that he occasionally smokes. Denies fever/chills and recent travel. 31 y/o Male with no PMHx presenting with chest congestion for the past 2 weeks and lower extremity swelling for the past 2 days. Patient states that he has lost feeling of his toes in the last 2 days. Patient also endorses coughing. Patient states that he's had SOB for the past week. Denies known allergies to medications. Patient states that he occasionally smokes. Denies fever/chills and recent travel.

## 2023-03-26 NOTE — ED PROVIDER NOTE - CARE PLAN
1 Principal Discharge DX:	Bilateral pneumonia  Secondary Diagnosis:	Strep throat  Secondary Diagnosis:	Anasarca

## 2023-03-26 NOTE — ED ADULT NURSE NOTE - OBJECTIVE STATEMENT
Pt aox3 with steady gait. Pt states cough congestion body aches and subj fevers for the last two weeks. In the last two days pt endorses "leg swelling and heaviness" with dark urine. Denies recent workouts or strenuous activity.

## 2023-03-27 ENCOUNTER — INPATIENT (INPATIENT)
Facility: HOSPITAL | Age: 33
LOS: 4 days | Discharge: HOME | DRG: 193 | End: 2023-04-01
Attending: STUDENT IN AN ORGANIZED HEALTH CARE EDUCATION/TRAINING PROGRAM | Admitting: GENERAL ACUTE CARE HOSPITAL
Payer: MEDICAID

## 2023-03-27 DIAGNOSIS — N28.9 DISORDER OF KIDNEY AND URETER, UNSPECIFIED: ICD-10-CM

## 2023-03-27 DIAGNOSIS — E88.09 OTHER DISORDERS OF PLASMA-PROTEIN METABOLISM, NOT ELSEWHERE CLASSIFIED: ICD-10-CM

## 2023-03-27 DIAGNOSIS — J18.9 PNEUMONIA, UNSPECIFIED ORGANISM: ICD-10-CM

## 2023-03-27 DIAGNOSIS — D75.839 THROMBOCYTOSIS, UNSPECIFIED: ICD-10-CM

## 2023-03-27 DIAGNOSIS — Z29.9 ENCOUNTER FOR PROPHYLACTIC MEASURES, UNSPECIFIED: ICD-10-CM

## 2023-03-27 DIAGNOSIS — K60.2 ANAL FISSURE, UNSPECIFIED: Chronic | ICD-10-CM

## 2023-03-27 DIAGNOSIS — N17.9 ACUTE KIDNEY FAILURE, UNSPECIFIED: ICD-10-CM

## 2023-03-27 DIAGNOSIS — D64.9 ANEMIA, UNSPECIFIED: ICD-10-CM

## 2023-03-27 DIAGNOSIS — R60.1 GENERALIZED EDEMA: ICD-10-CM

## 2023-03-27 DIAGNOSIS — F19.10 OTHER PSYCHOACTIVE SUBSTANCE ABUSE, UNCOMPLICATED: ICD-10-CM

## 2023-03-27 LAB
ALBUMIN SERPL ELPH-MCNC: 1.9 G/DL — LOW (ref 3.4–5)
ALP SERPL-CCNC: 99 U/L — SIGNIFICANT CHANGE UP (ref 40–120)
ALT FLD-CCNC: 62 U/L — HIGH (ref 12–42)
ANION GAP SERPL CALC-SCNC: 6 MMOL/L — LOW (ref 9–16)
AST SERPL-CCNC: 57 U/L — HIGH (ref 15–37)
BASOPHILS # BLD AUTO: 0.03 K/UL — SIGNIFICANT CHANGE UP (ref 0–0.2)
BASOPHILS NFR BLD AUTO: 0.4 % — SIGNIFICANT CHANGE UP (ref 0–2)
BILIRUB SERPL-MCNC: 0.2 MG/DL — SIGNIFICANT CHANGE UP (ref 0.2–1.2)
BUN SERPL-MCNC: 15 MG/DL — SIGNIFICANT CHANGE UP (ref 7–23)
CALCIUM SERPL-MCNC: 8.4 MG/DL — LOW (ref 8.5–10.5)
CHLORIDE SERPL-SCNC: 108 MMOL/L — SIGNIFICANT CHANGE UP (ref 96–108)
CO2 SERPL-SCNC: 27 MMOL/L — SIGNIFICANT CHANGE UP (ref 22–31)
CREAT SERPL-MCNC: 0.98 MG/DL — SIGNIFICANT CHANGE UP (ref 0.5–1.3)
CRP SERPL-MCNC: 9 MG/L — SIGNIFICANT CHANGE UP (ref 0–9)
EGFR: 104 ML/MIN/1.73M2 — SIGNIFICANT CHANGE UP
EOSINOPHIL # BLD AUTO: 0.26 K/UL — SIGNIFICANT CHANGE UP (ref 0–0.5)
EOSINOPHIL NFR BLD AUTO: 3.8 % — SIGNIFICANT CHANGE UP (ref 0–6)
GLUCOSE SERPL-MCNC: 100 MG/DL — HIGH (ref 70–99)
HCT VFR BLD CALC: 28.1 % — LOW (ref 39–50)
HGB BLD-MCNC: 8.9 G/DL — LOW (ref 13–17)
IMM GRANULOCYTES NFR BLD AUTO: 0.3 % — SIGNIFICANT CHANGE UP (ref 0–0.9)
LYMPHOCYTES # BLD AUTO: 1.79 K/UL — SIGNIFICANT CHANGE UP (ref 1–3.3)
LYMPHOCYTES # BLD AUTO: 26.3 % — SIGNIFICANT CHANGE UP (ref 13–44)
MCHC RBC-ENTMCNC: 28.2 PG — SIGNIFICANT CHANGE UP (ref 27–34)
MCHC RBC-ENTMCNC: 31.7 GM/DL — LOW (ref 32–36)
MCV RBC AUTO: 88.9 FL — SIGNIFICANT CHANGE UP (ref 80–100)
MONOCYTES # BLD AUTO: 0.41 K/UL — SIGNIFICANT CHANGE UP (ref 0–0.9)
MONOCYTES NFR BLD AUTO: 6 % — SIGNIFICANT CHANGE UP (ref 2–14)
NEUTROPHILS # BLD AUTO: 4.3 K/UL — SIGNIFICANT CHANGE UP (ref 1.8–7.4)
NEUTROPHILS NFR BLD AUTO: 63.2 % — SIGNIFICANT CHANGE UP (ref 43–77)
NRBC # BLD: 0 /100 WBCS — SIGNIFICANT CHANGE UP (ref 0–0)
PLATELET # BLD AUTO: 419 K/UL — HIGH (ref 150–400)
POTASSIUM SERPL-MCNC: 4.1 MMOL/L — SIGNIFICANT CHANGE UP (ref 3.5–5.3)
POTASSIUM SERPL-SCNC: 4.1 MMOL/L — SIGNIFICANT CHANGE UP (ref 3.5–5.3)
PROT SERPL-MCNC: 7 G/DL — SIGNIFICANT CHANGE UP (ref 6.4–8.2)
RBC # BLD: 3.16 M/UL — LOW (ref 4.2–5.8)
RBC # FLD: 13.3 % — SIGNIFICANT CHANGE UP (ref 10.3–14.5)
SODIUM SERPL-SCNC: 141 MMOL/L — SIGNIFICANT CHANGE UP (ref 132–145)
WBC # BLD: 6.81 K/UL — SIGNIFICANT CHANGE UP (ref 3.8–10.5)
WBC # FLD AUTO: 6.81 K/UL — SIGNIFICANT CHANGE UP (ref 3.8–10.5)

## 2023-03-27 PROCEDURE — 99223 1ST HOSP IP/OBS HIGH 75: CPT | Mod: GC

## 2023-03-27 RX ORDER — ENOXAPARIN SODIUM 100 MG/ML
40 INJECTION SUBCUTANEOUS EVERY 24 HOURS
Refills: 0 | Status: DISCONTINUED | OUTPATIENT
Start: 2023-03-27 | End: 2023-04-01

## 2023-03-27 RX ORDER — QUETIAPINE FUMARATE 200 MG/1
50 TABLET, FILM COATED ORAL AT BEDTIME
Refills: 0 | Status: DISCONTINUED | OUTPATIENT
Start: 2023-03-27 | End: 2023-03-28

## 2023-03-27 RX ORDER — PROCHLORPERAZINE MALEATE 5 MG
10 TABLET ORAL EVERY 8 HOURS
Refills: 0 | Status: DISCONTINUED | OUTPATIENT
Start: 2023-03-27 | End: 2023-04-01

## 2023-03-27 RX ORDER — ACETAMINOPHEN 500 MG
650 TABLET ORAL EVERY 6 HOURS
Refills: 0 | Status: DISCONTINUED | OUTPATIENT
Start: 2023-03-27 | End: 2023-04-01

## 2023-03-27 RX ORDER — DIAZEPAM 5 MG
10 TABLET ORAL ONCE
Refills: 0 | Status: DISCONTINUED | OUTPATIENT
Start: 2023-03-27 | End: 2023-03-27

## 2023-03-27 RX ORDER — CEFTRIAXONE 500 MG/1
1000 INJECTION, POWDER, FOR SOLUTION INTRAMUSCULAR; INTRAVENOUS EVERY 24 HOURS
Refills: 0 | Status: DISCONTINUED | OUTPATIENT
Start: 2023-03-27 | End: 2023-03-27

## 2023-03-27 RX ORDER — HYDROXYZINE HCL 10 MG
50 TABLET ORAL EVERY 4 HOURS
Refills: 0 | Status: DISCONTINUED | OUTPATIENT
Start: 2023-03-27 | End: 2023-03-27

## 2023-03-27 RX ORDER — LOPERAMIDE HCL 2 MG
2 TABLET ORAL EVERY 4 HOURS
Refills: 0 | Status: DISCONTINUED | OUTPATIENT
Start: 2023-03-27 | End: 2023-04-01

## 2023-03-27 RX ORDER — HYDROXYZINE HCL 10 MG
25 TABLET ORAL ONCE
Refills: 0 | Status: COMPLETED | OUTPATIENT
Start: 2023-03-27 | End: 2023-03-27

## 2023-03-27 RX ORDER — CEFTRIAXONE 500 MG/1
1000 INJECTION, POWDER, FOR SOLUTION INTRAMUSCULAR; INTRAVENOUS EVERY 24 HOURS
Refills: 0 | Status: DISCONTINUED | OUTPATIENT
Start: 2023-03-28 | End: 2023-03-29

## 2023-03-27 RX ORDER — AZITHROMYCIN 500 MG/1
500 TABLET, FILM COATED ORAL EVERY 24 HOURS
Refills: 0 | Status: DISCONTINUED | OUTPATIENT
Start: 2023-03-27 | End: 2023-03-27

## 2023-03-27 RX ORDER — AZITHROMYCIN 500 MG/1
500 TABLET, FILM COATED ORAL EVERY 24 HOURS
Refills: 0 | Status: COMPLETED | OUTPATIENT
Start: 2023-03-28 | End: 2023-03-28

## 2023-03-27 RX ORDER — QUETIAPINE FUMARATE 200 MG/1
50 TABLET, FILM COATED ORAL ONCE
Refills: 0 | Status: COMPLETED | OUTPATIENT
Start: 2023-03-27 | End: 2023-03-27

## 2023-03-27 RX ADMIN — QUETIAPINE FUMARATE 50 MILLIGRAM(S): 200 TABLET, FILM COATED ORAL at 23:01

## 2023-03-27 RX ADMIN — Medication 0.1 MILLIGRAM(S): at 09:06

## 2023-03-27 RX ADMIN — CEFTRIAXONE 1000 MILLIGRAM(S): 500 INJECTION, POWDER, FOR SOLUTION INTRAMUSCULAR; INTRAVENOUS at 15:41

## 2023-03-27 RX ADMIN — Medication 50 MILLIGRAM(S): at 21:12

## 2023-03-27 RX ADMIN — CEFTRIAXONE 100 MILLIGRAM(S): 500 INJECTION, POWDER, FOR SOLUTION INTRAMUSCULAR; INTRAVENOUS at 14:39

## 2023-03-27 RX ADMIN — Medication 1 MILLIGRAM(S): at 02:54

## 2023-03-27 RX ADMIN — QUETIAPINE FUMARATE 50 MILLIGRAM(S): 200 TABLET, FILM COATED ORAL at 01:10

## 2023-03-27 RX ADMIN — ENOXAPARIN SODIUM 40 MILLIGRAM(S): 100 INJECTION SUBCUTANEOUS at 21:17

## 2023-03-27 RX ADMIN — Medication 650 MILLIGRAM(S): at 23:07

## 2023-03-27 RX ADMIN — Medication 10 MILLIGRAM(S): at 09:05

## 2023-03-27 RX ADMIN — Medication 25 MILLIGRAM(S): at 09:06

## 2023-03-27 RX ADMIN — AZITHROMYCIN 255 MILLIGRAM(S): 500 TABLET, FILM COATED ORAL at 15:40

## 2023-03-27 RX ADMIN — Medication 650 MILLIGRAM(S): at 22:07

## 2023-03-27 NOTE — H&P ADULT - HISTORY OF PRESENT ILLNESS
33 y/o M with Hx of polysubstance abuse presented to ED with complaints of 1 weeks chest congestion, cough, SOB and subjective fevers. He reports developing lower extremity swelling suddenly 2 days ago which has since resolved since arrival to [facility] which was preceded by loss of feeling in his toes.  Pt denies recnt travel, sick contacts. Endorses chills, anxiety, back pain, restless, legs and intermittent diarrhea which he believes consistent with withdrawal.    In the ED:  Initial vital signs: T: 99.2F, HR: 85, BP: 125/74, R: 19, SpO2: 94% on RA  Labs: significant for Hgb 10.2>>8.9, Plt 566>>419, Cr 1.32>>.98, albumin 1.9, corrected Ca 10.1, AST/ALT 57/62, pro-BNP 1722, CKMB 4.6; U/A +protein, blood, WBC, RBC, hyaline casts bacteria & epithelial cells  Imaging:  CXR: diffuse opacities concerning for multifocal PNA  B/L LE dopplers: no DVTs  CT PE & A/P: no PE, anasarca and multifocal PNA  EKG: NSR | HR 76, QTc 459  Medications: clonidine 0.3 mg PO, diazepam 25mg IV, hydroxyzine 75mg PO, lorazepam 1mg IV, quetiapine 50mg PO, azithromycin 500mg IV, CTX 2g mg IV, NS 1L  Consults: none

## 2023-03-27 NOTE — H&P ADULT - ASSESSMENT
33 y/o M with Hx of polysubstance abuse presented to ED with complaints of 1 weeks chest congestion, cough, SOB and subjective fevers and 2 days of LE edema found to have strep multifocal PNA with U/A suggestive of nephrotic process.

## 2023-03-27 NOTE — H&P ADULT - PROBLEM SELECTOR PLAN 2
Found to have multifocal PNA, strep group A positive and antistreptolysin O 1239. Cr 1.32 >> .98; U/A +protein, blood, WBC, RBC, hyaline casts, bacteria & epithelial cells. DDx includes  poststreptococcal glomerulonephritis as onset of edema abrupt and in relationship to strep PNA, however only hyaline casts in U/A vs pre-renal MILDRED iso volume down state. s/p NS 1L    Plan:  - F/U urine studies  - F/U complement C3/C4  - F/U hepatitis panel & HIV  - Consult renal in AM Found to have multifocal PNA, strep group A positive and antistreptolysin O 1239. Cr 1.32 >> .98; U/A +protein, blood, WBC, RBC, hyaline casts, bacteria & epithelial cells. DDx includes  poststreptococcal glomerulonephritis as onset of edema abrupt and in relationship to strep PNA, however only hyaline casts in U/A so less likely vs pre-renal MILDRED iso volume down state. s/p NS 1L    Plan:  - F/U urine studies  - F/U complement C3/C4  - F/U SPEP/UPEP

## 2023-03-27 NOTE — H&P ADULT - NSHPPHYSICALEXAM_GEN_ALL_CORE
VITALS: T(C): 36.9 (03-27-23 @ 20:15), Max: 37.1 (03-27-23 @ 01:36)  T(F): 98.4 (03-27-23 @ 20:15), Max: 98.7 (03-27-23 @ 01:36)  HR: 93 (03-27-23 @ 20:15) (57 - 93)  BP: 155/92 (03-27-23 @ 20:15) (134/80 - 169/101)  ABP: --  ABP(mean): --  RR: 17 (03-27-23 @ 20:15) (16 - 20)  SpO2: 99% (03-27-23 @ 20:15) (96% - 100%)    GENERAL: pt appears anxious  HEAD:  Atraumatic, Normocephalic  EYES: EOMI, PERRLA, conjunctiva and sclera clear  ENT: Moist mucous membranes  NECK: Supple, No JVD  CHEST/LUNG: Clear to auscultation bilaterally; No rales, rhonchi, wheezing, or rubs. Unlabored respirations  HEART: Regular rate and rhythm; No murmurs, rubs, or gallops  ABDOMEN: Bowel sounds present; Soft, Nontender, Nondistended. No hepatomegally  EXTREMITIES:  2+ Peripheral Pulses, brisk capillary refill. No clubbing, cyanosis, or edema  NERVOUS SYSTEM:  Alert & Oriented X3, speech clear. No deficits   MSK: FROM all 4 extremities, full and equal strength  SKIN: No rashes or lesions

## 2023-03-27 NOTE — ED ADULT NURSE REASSESSMENT NOTE - NS ED NURSE REASSESS COMMENT FT1
pt began feeling abdominal pain, nausea, vomiting - MD Salinas made aware. zofran administered to pt.

## 2023-03-27 NOTE — H&P ADULT - PROBLEM SELECTOR PLAN 4
Pt has hx of heroin use, UTox positive for cocaine and opiates. Pt refusing suboxone and methadone, would just like withdrawal relief.    Plan:  - Monitor COWS  - Cont hydroxyzine 50mg PO q4hrs PRN for anxiety  - Cont loperamide 2mg PO q4hrs PRN for diarrhea  - Cont prochlorperazine 10mg PO q8hrs PRN for N/V  - F/U HIV and Hep C Pt has hx of heroin use, UTox positive for cocaine and opiates. Pt refusing suboxone and methadone, would just like withdrawal relief.    Plan:  - Monitor COWS  - Cont hydroxyzine 50mg PO q4hrs PRN for anxiety  - Cont loperamide 2mg PO q4hrs PRN for diarrhea  - Cont prochlorperazine 10mg PO q8hrs PRN for N/V  - Cont seroquel 50mg PO qhs PRN   - F/U HIV and Hep C Pt noted to have anasarca on admission, now improved on exam during admission. Likely 2/2 hypoalbuminemia in the setting of nephrotic process. Pt presenting with 1 week of coughing, SOB, subjective fevers, imaging consistent w/ multifocal PNA    Plan:  - F/U SPEP/UPEP  - Compression socks  - Elevate LEs  - Ambulateas tolerated Hgb 10.2>>8.9, denies hx of anemia, normocytic. No active signs of bleeding    Plan:  - F/U iron studies  - F/U TSH & T4  - Maintain active T&S  - Transfusion goal 7

## 2023-03-27 NOTE — H&P ADULT - PROBLEM SELECTOR PLAN 3
Pt noted to have anasarca on admission, now improved on exam during admission. Likely 2/2 hypoalbuminemia in the setting of nephrotic process. Pt presenting with 1 week of coughing, SOB, subjective fevers, imaging consistent w/ multifocal PNA    Plan:  - Compression socks  - Elevate LEs  - Ambulateas tolerated Found to have multifocal PNA, strep group A positive and antistreptolysin O 1239. Cr 1.32 >> .98; U/A +protein, blood, WBC, RBC, hyaline casts, bacteria & epithelial cells. DDx includes  poststreptococcal glomerulonephritis as onset of edema abrupt and in relationship to strep PNA, however only hyaline casts in U/A so less likely vs pre-renal MILDRED iso volume down state. s/p NS 1L    Plan:  - F/U urine studies  - F/U complement C3/C4  - F/U hepatitis panel & HIV Pt noted to have anasarca on admission, now improved on exam during admission. Likely 2/2 hypoalbuminemia in the setting of nephrotic process. Pt presenting with 1 week of coughing, SOB, subjective fevers, imaging consistent w/ multifocal PNA    Plan:  - F/U SPEP/UPEP  - F/U hepatitis panel & HIV  - Compression socks  - Elevate LEs  - Ambulate as tolerated

## 2023-03-27 NOTE — H&P ADULT - PROBLEM SELECTOR PLAN 5
F: s/p NS 1L  E: replete K<4, Mg<2  N: Regular  D: Lovenox    Code: FULL  Dispo: [facility]STE[facility] Pt has hx of heroin use, UTox positive for cocaine and opiates. Pt refusing Suboxone and methadone, would just like withdrawal relief. COWS 8 during exam on arrival     Plan:  - Monitor COWS  - Cont clonidine 0.1mg BID PRN  - Cont loperamide 2mg PO q4hrs PRN for diarrhea  - Cont prochlorperazine 10mg PO q8hrs PRN for N/V  - Cont seroquel 50mg PO qhs PRN   - F/U HIV and Hep C Plt 566>>419, likely reactive    Plan:  - Monitor CBC

## 2023-03-27 NOTE — H&P ADULT - NSHPLABSRESULTS_GEN_ALL_CORE
.  LABS:                         8.9    6.81  )-----------( 419      ( 27 Mar 2023 08:40 )             28.1         141  |  108  |  15  ----------------------------<  100<H>  4.1   |  27  |  0.98    Ca    8.4<L>      27 Mar 2023 08:40    TPro  7.0  /  Alb  1.9<L>  /  TBili  0.2  /  DBili  x   /  AST  57<H>  /  ALT  62<H>  /  AlkPhos  99      PT/INR - ( 26 Mar 2023 12:47 )   PT: 12.7 sec;   INR: 1.08          PTT - ( 26 Mar 2023 12:47 )  PTT:29.9 sec  Urinalysis Basic - ( 26 Mar 2023 12:47 )    Color: Yellow / Appearance: Clear / S.020 / pH: x  Gluc: x / Ketone: NEGATIVE  / Bili: NEGATIVE / Urobili: 0.2 E.U./dL   Blood: x / Protein: 100 mg/dL / Nitrite: NEGATIVE   Leuk Esterase: Small / RBC: > 10 /HPF / WBC 5-10 /HPF   Sq Epi: x / Non Sq Epi: 5-10 /HPF / Bacteria: Present /HPF            RADIOLOGY, EKG & ADDITIONAL TESTS: Reviewed.

## 2023-03-27 NOTE — ED ADULT NURSE REASSESSMENT NOTE - NS ED NURSE REASSESS COMMENT FT1
Pt. reports feeling very restless and more irritable because unable to fall asleep. Reported some relief with last set of medications but now just wants to sleep. Pt. out of bed, next to stretcher and pacing back and forth. MD Vila made aware.

## 2023-03-27 NOTE — H&P ADULT - PROBLEM SELECTOR PLAN 6
F: s/p NS 1L  E: replete K<4, Mg<2  N: Regular  D: Lovenox    Code: FULL  Dispo: ARISTEO Pt has hx of heroin use, UTox positive for cocaine and opiates. Pt refusing Suboxone and methadone, would just like withdrawal relief. COWS 8 during exam on arrival     Plan:  - Monitor COWS  - Cont clonidine 0.1mg BID PRN  - Cont loperamide 2mg PO q4hrs PRN for diarrhea  - Cont prochlorperazine 10mg PO q8hrs PRN for N/V  - Cont seroquel 50mg PO qhs PRN   - F/U HIV and Hep C

## 2023-03-27 NOTE — H&P ADULT - PROBLEM SELECTOR PLAN 1
Presenting with 1 week of SOB, cough and subjective fevers, CT notable for multifocal PNA. Strep group A +. Not meeting SIRS criteria. S/P CTX and azithromycin in ED    Plan:  - Cont ceftriaxone 2g IV q24hrs and azithromycin 500mg IV q24hr  - F/U BCx  - F/U strep, legionella urine antigen  - F/U MRSA swab Presenting with 1 week of SOB, cough and subjective fevers, CT notable for multifocal PNA. Strep group A +. Not meeting SIRS criteria. S/P CTX and azithromycin in ED    Plan:  - Cont ceftriaxone 1g IV q24hrs and azithromycin 500mg IV q24hr  - F/U BCx  - F/U strep, legionella urine antigen  - F/U MRSA swab

## 2023-03-27 NOTE — PATIENT PROFILE ADULT - FALL HARM RISK - UNIVERSAL INTERVENTIONS
Bed in lowest position, wheels locked, appropriate side rails in place/Call bell, personal items and telephone in reach/Instruct patient to call for assistance before getting out of bed or chair/Non-slip footwear when patient is out of bed/[place] to call system/Physically safe environment - no spills, clutter or unnecessary equipment/Purposeful Proactive Rounding/Room/bathroom lighting operational, light cord in reach

## 2023-03-27 NOTE — H&P ADULT - ATTENDING COMMENTS
#multifocal pna: c/w ceft/azithro, f/up urine legionella, strep ag, sputum cx.    #MILDRED: likely prerenal in setting of decreased PO intake, improved s/p fluids. Continue to monitor    #Polysubstance abuse: utox +cocaine/heroin. Elevated cows in ED s/p clonidine and Seroquel. Pt refusing methdone, c/w symptom control, monitor cows

## 2023-03-28 LAB
ALBUMIN SERPL ELPH-MCNC: 2.4 G/DL — LOW (ref 3.3–5)
ALP SERPL-CCNC: 93 U/L — SIGNIFICANT CHANGE UP (ref 40–120)
ALT FLD-CCNC: 39 U/L — SIGNIFICANT CHANGE UP (ref 10–45)
ANION GAP SERPL CALC-SCNC: 5 MMOL/L — SIGNIFICANT CHANGE UP (ref 5–17)
APPEARANCE UR: ABNORMAL
AST SERPL-CCNC: 24 U/L — SIGNIFICANT CHANGE UP (ref 10–40)
BACTERIA # UR AUTO: PRESENT /HPF
BASOPHILS # BLD AUTO: 0.04 K/UL — SIGNIFICANT CHANGE UP (ref 0–0.2)
BASOPHILS NFR BLD AUTO: 0.6 % — SIGNIFICANT CHANGE UP (ref 0–2)
BILIRUB SERPL-MCNC: <0.2 MG/DL — SIGNIFICANT CHANGE UP (ref 0.2–1.2)
BILIRUB UR-MCNC: NEGATIVE — SIGNIFICANT CHANGE UP
BUN SERPL-MCNC: 11 MG/DL — SIGNIFICANT CHANGE UP (ref 7–23)
C3 SERPL-MCNC: 15 MG/DL — LOW (ref 81–157)
C4 SERPL-MCNC: 17 MG/DL — SIGNIFICANT CHANGE UP (ref 13–39)
CALCIUM SERPL-MCNC: 8.7 MG/DL — SIGNIFICANT CHANGE UP (ref 8.4–10.5)
CHLORIDE SERPL-SCNC: 108 MMOL/L — SIGNIFICANT CHANGE UP (ref 96–108)
CO2 SERPL-SCNC: 26 MMOL/L — SIGNIFICANT CHANGE UP (ref 22–31)
COLOR SPEC: YELLOW — SIGNIFICANT CHANGE UP
COMMENT - URINE: SIGNIFICANT CHANGE UP
CREAT ?TM UR-MCNC: 211 MG/DL — SIGNIFICANT CHANGE UP
CREAT SERPL-MCNC: 0.95 MG/DL — SIGNIFICANT CHANGE UP (ref 0.5–1.3)
DIFF PNL FLD: ABNORMAL
EGFR: 108 ML/MIN/1.73M2 — SIGNIFICANT CHANGE UP
EOSINOPHIL # BLD AUTO: 0.1 K/UL — SIGNIFICANT CHANGE UP (ref 0–0.5)
EOSINOPHIL NFR BLD AUTO: 1.4 % — SIGNIFICANT CHANGE UP (ref 0–6)
EPI CELLS # UR: SIGNIFICANT CHANGE UP /HPF (ref 0–5)
FERRITIN SERPL-MCNC: 152 NG/ML — SIGNIFICANT CHANGE UP (ref 30–400)
GLUCOSE SERPL-MCNC: 122 MG/DL — HIGH (ref 70–99)
GLUCOSE UR QL: NEGATIVE — SIGNIFICANT CHANGE UP
HAV IGM SER-ACNC: SIGNIFICANT CHANGE UP
HBV CORE AB SER-ACNC: SIGNIFICANT CHANGE UP
HBV CORE IGM SER-ACNC: SIGNIFICANT CHANGE UP
HBV SURFACE AB SER-ACNC: SIGNIFICANT CHANGE UP
HBV SURFACE AG SER-ACNC: SIGNIFICANT CHANGE UP
HCT VFR BLD CALC: 27.5 % — LOW (ref 39–50)
HGB BLD-MCNC: 8.9 G/DL — LOW (ref 13–17)
HIV 1+2 AB+HIV1 P24 AG SERPL QL IA: SIGNIFICANT CHANGE UP
HIV 1+2 AB+HIV1 P24 AG SERPL QL IA: SIGNIFICANT CHANGE UP
HYALINE CASTS # UR AUTO: ABNORMAL /LPF (ref 0–2)
IMM GRANULOCYTES NFR BLD AUTO: 0.3 % — SIGNIFICANT CHANGE UP (ref 0–0.9)
IRON SATN MFR SERPL: 19 % — SIGNIFICANT CHANGE UP (ref 16–55)
IRON SATN MFR SERPL: 36 UG/DL — LOW (ref 45–165)
KETONES UR-MCNC: NEGATIVE — SIGNIFICANT CHANGE UP
LEGIONELLA AG UR QL: NEGATIVE — SIGNIFICANT CHANGE UP
LEUKOCYTE ESTERASE UR-ACNC: ABNORMAL
LYMPHOCYTES # BLD AUTO: 1.96 K/UL — SIGNIFICANT CHANGE UP (ref 1–3.3)
LYMPHOCYTES # BLD AUTO: 27 % — SIGNIFICANT CHANGE UP (ref 13–44)
MAGNESIUM SERPL-MCNC: 1.9 MG/DL — SIGNIFICANT CHANGE UP (ref 1.6–2.6)
MCHC RBC-ENTMCNC: 28.2 PG — SIGNIFICANT CHANGE UP (ref 27–34)
MCHC RBC-ENTMCNC: 32.4 GM/DL — SIGNIFICANT CHANGE UP (ref 32–36)
MCV RBC AUTO: 87 FL — SIGNIFICANT CHANGE UP (ref 80–100)
MONOCYTES # BLD AUTO: 0.54 K/UL — SIGNIFICANT CHANGE UP (ref 0–0.9)
MONOCYTES NFR BLD AUTO: 7.4 % — SIGNIFICANT CHANGE UP (ref 2–14)
NEUTROPHILS # BLD AUTO: 4.6 K/UL — SIGNIFICANT CHANGE UP (ref 1.8–7.4)
NEUTROPHILS NFR BLD AUTO: 63.3 % — SIGNIFICANT CHANGE UP (ref 43–77)
NITRITE UR-MCNC: NEGATIVE — SIGNIFICANT CHANGE UP
NRBC # BLD: 0 /100 WBCS — SIGNIFICANT CHANGE UP (ref 0–0)
PH UR: 6 — SIGNIFICANT CHANGE UP (ref 5–8)
PHOSPHATE SERPL-MCNC: 2.5 MG/DL — SIGNIFICANT CHANGE UP (ref 2.5–4.5)
PLATELET # BLD AUTO: 428 K/UL — HIGH (ref 150–400)
POTASSIUM SERPL-MCNC: 4.1 MMOL/L — SIGNIFICANT CHANGE UP (ref 3.5–5.3)
POTASSIUM SERPL-SCNC: 4.1 MMOL/L — SIGNIFICANT CHANGE UP (ref 3.5–5.3)
PROT ?TM UR-MCNC: 275 MG/DL — SIGNIFICANT CHANGE UP (ref 0–12)
PROT SERPL-MCNC: 6.6 G/DL — SIGNIFICANT CHANGE UP (ref 6–8.3)
PROT UR-MCNC: >=300 MG/DL
PROT/CREAT UR-RTO: 1.3 RATIO — HIGH (ref 0–0.2)
RBC # BLD: 3.16 M/UL — LOW (ref 4.2–5.8)
RBC # BLD: 3.16 M/UL — LOW (ref 4.2–5.8)
RBC # FLD: 13.2 % — SIGNIFICANT CHANGE UP (ref 10.3–14.5)
RBC CASTS # UR COMP ASSIST: ABNORMAL /HPF
RETICS #: 55 K/UL — SIGNIFICANT CHANGE UP (ref 25–125)
RETICS/RBC NFR: 1.7 % — SIGNIFICANT CHANGE UP (ref 0.5–2.5)
S PNEUM AG UR QL: NEGATIVE — SIGNIFICANT CHANGE UP
SODIUM SERPL-SCNC: 139 MMOL/L — SIGNIFICANT CHANGE UP (ref 135–145)
SODIUM UR-SCNC: 115 MMOL/L — SIGNIFICANT CHANGE UP
SP GR SPEC: >=1.03 — SIGNIFICANT CHANGE UP (ref 1–1.03)
T4 AB SER-ACNC: 3.7 UG/DL — LOW (ref 4.5–11.7)
TIBC SERPL-MCNC: 194 UG/DL — LOW (ref 220–430)
TSH SERPL-MCNC: 2.67 UIU/ML — SIGNIFICANT CHANGE UP (ref 0.27–4.2)
UIBC SERPL-MCNC: 158 UG/DL — SIGNIFICANT CHANGE UP (ref 110–370)
UROBILINOGEN FLD QL: 0.2 E.U./DL — SIGNIFICANT CHANGE UP
WBC # BLD: 7.26 K/UL — SIGNIFICANT CHANGE UP (ref 3.8–10.5)
WBC # FLD AUTO: 7.26 K/UL — SIGNIFICANT CHANGE UP (ref 3.8–10.5)
WBC UR QL: > 10 /HPF

## 2023-03-28 PROCEDURE — 99222 1ST HOSP IP/OBS MODERATE 55: CPT

## 2023-03-28 PROCEDURE — 99233 SBSQ HOSP IP/OBS HIGH 50: CPT | Mod: GC

## 2023-03-28 RX ORDER — METHADONE HYDROCHLORIDE 40 MG/1
10 TABLET ORAL ONCE
Refills: 0 | Status: DISCONTINUED | OUTPATIENT
Start: 2023-03-28 | End: 2023-03-28

## 2023-03-28 RX ORDER — LANOLIN ALCOHOL/MO/W.PET/CERES
10 CREAM (GRAM) TOPICAL AT BEDTIME
Refills: 0 | Status: DISCONTINUED | OUTPATIENT
Start: 2023-03-28 | End: 2023-04-01

## 2023-03-28 RX ORDER — METHADONE HYDROCHLORIDE 40 MG/1
20 TABLET ORAL ONCE
Refills: 0 | Status: DISCONTINUED | OUTPATIENT
Start: 2023-03-28 | End: 2023-03-28

## 2023-03-28 RX ORDER — ACETAMINOPHEN 500 MG
650 TABLET ORAL ONCE
Refills: 0 | Status: COMPLETED | OUTPATIENT
Start: 2023-03-28 | End: 2023-03-28

## 2023-03-28 RX ORDER — LIDOCAINE 4 G/100G
1 CREAM TOPICAL ONCE
Refills: 0 | Status: COMPLETED | OUTPATIENT
Start: 2023-03-28 | End: 2023-03-28

## 2023-03-28 RX ORDER — QUETIAPINE FUMARATE 200 MG/1
50 TABLET, FILM COATED ORAL EVERY 6 HOURS
Refills: 0 | Status: DISCONTINUED | OUTPATIENT
Start: 2023-03-28 | End: 2023-04-01

## 2023-03-28 RX ORDER — METHADONE HYDROCHLORIDE 40 MG/1
5 TABLET ORAL EVERY 4 HOURS
Refills: 0 | Status: DISCONTINUED | OUTPATIENT
Start: 2023-03-28 | End: 2023-04-01

## 2023-03-28 RX ADMIN — CEFTRIAXONE 100 MILLIGRAM(S): 500 INJECTION, POWDER, FOR SOLUTION INTRAMUSCULAR; INTRAVENOUS at 14:35

## 2023-03-28 RX ADMIN — LIDOCAINE 1 PATCH: 4 CREAM TOPICAL at 18:53

## 2023-03-28 RX ADMIN — Medication 10 MILLIGRAM(S): at 03:31

## 2023-03-28 RX ADMIN — Medication 650 MILLIGRAM(S): at 16:00

## 2023-03-28 RX ADMIN — LIDOCAINE 1 PATCH: 4 CREAM TOPICAL at 10:56

## 2023-03-28 RX ADMIN — Medication 650 MILLIGRAM(S): at 15:27

## 2023-03-28 RX ADMIN — Medication 0.1 MILLIGRAM(S): at 00:00

## 2023-03-28 RX ADMIN — AZITHROMYCIN 500 MILLIGRAM(S): 500 TABLET, FILM COATED ORAL at 14:35

## 2023-03-28 RX ADMIN — Medication 650 MILLIGRAM(S): at 03:45

## 2023-03-28 RX ADMIN — Medication 650 MILLIGRAM(S): at 02:45

## 2023-03-28 RX ADMIN — Medication 0.1 MILLIGRAM(S): at 15:33

## 2023-03-28 RX ADMIN — METHADONE HYDROCHLORIDE 20 MILLIGRAM(S): 40 TABLET ORAL at 05:36

## 2023-03-28 NOTE — CONSULT NOTE ADULT - ASSESSMENT
33 y/o M with Hx of polysubstance abuse (utox + for cocaine and opiates) presented to the ED with complaints of cough/shortness of breath fevers and LE edema found to be strep + w/ elevated antistreptolysin Ab nephrology consulted    Assessment/Plan:   #non-oliguric MILDRED   UA w/protein and blood   uPCR 1.5  unclear baseline creatinine  etiology of MILDRED includes:  post-strep GN given elevated antistreptolysin ab  pre-renal improving with IVF    Recommend:   please obtain C3 C4 C-ANCA P-ANCA MPO Proteinase-3  if edema persists or worsens would give trial of lasix  if BP remains elevated even after patient no longer withdrawing would start an ace-i or arb  daily BMP  strict I/Os   renal diet     Thank you for the opportunity to participate in the care of your patient. The nephrology service remains available to assist with any questions or concerns. Please feel free to reach us by paging the on-call nephrology fellow for urgent issues or as below.   Imani Pritchard D.O  PGY-5, Nephrology Fellow   [phone]

## 2023-03-28 NOTE — BH CONSULTATION LIAISON ASSESSMENT NOTE - HPI (INCLUDE ILLNESS QUALITY, SEVERITY, DURATION, TIMING, CONTEXT, MODIFYING FACTORS, ASSOCIATED SIGNS AND SYMPTOMS)
Pt is a 32 man with no PPH, long history of polysubstance abuse (opiates, cocaine, alcohol- social) who presented to ED with complaints of 1 weeks chest congestion, cough, SOB and subjective fevers and 2 days of LE edema found to have strep multifocal PNA with U/A suggestive of nephrotic process. Psychiatry was consulted to provide recommendations for opioid withdrawal.   Patient was seen at bedside. Pt's partner was in the room. He reports long history of opioid abuse- started snorting heroin regularly >3-4 years ago after abusing opioid medications for years (was initially prescribed for a annal fissure). Pt reports snorting 1-2 envelopes of heroin per day and being able to function in his professional life as a . He reports multiple failed attempts to switch to suboxone, buprenorphine only in the past. He denies any hx of outpatient/inpatient substance abuse treatment.  Pt reports that today he received one dosage of methadone with good effect on his withdrawal sx (chart review shows methadone 20mgx1). On assessment pt presents calm, without any objective sx of withdrawal, c/o mild back pain.

## 2023-03-28 NOTE — PROGRESS NOTE ADULT - SUBJECTIVE AND OBJECTIVE BOX
O/N Events: Patient received methadone at 5am     Subjective/ROS: Patient seen and examined at bedside. Reports lower back pain.     Denies Fever/Chills, HA, CP, SOB, n/v, changes in bowel/urinary habits.  12pt ROS otherwise negative.    VITALS  Vital Signs Last 24 Hrs  T(C): 36.6 (28 Mar 2023 13:00), Max: 36.9 (27 Mar 2023 17:56)  T(F): 97.8 (28 Mar 2023 13:00), Max: 98.4 (27 Mar 2023 17:56)  HR: 69 (28 Mar 2023 13:00) (57 - 93)  BP: 131/69 (28 Mar 2023 13:00) (131/69 - 169/101)  BP(mean): --  RR: 18 (28 Mar 2023 13:00) (17 - 20)  SpO2: 98% (28 Mar 2023 13:00) (97% - 100%)    Parameters below as of 28 Mar 2023 13:00  Patient On (Oxygen Delivery Method): room air        CAPILLARY BLOOD GLUCOSE      PHYSICAL EXAM  GENERAL: In bed, mildly agitated  HEAD:  NC/AT, MMM   EYES: Pupils dilated   NECK: Supple, No JVD  CHEST/LUNG: Clear to auscultation bilaterally; No w/c/r   HEART: S1, S2 wnl, RRR; No murmurs, rubs, or gallops  ABDOMEN: Bowel sounds present, mildly hyperactive, soft, NTND. No masses   EXTREMITIES:  2+ Peripheral Pulses, brisk capillary refill. No clubbing, cyanosis, or edema  NERVOUS SYSTEM:  Alert & Oriented X3, No focal deficits   MSK: Lower back pain TTP     MEDICATIONS  (STANDING):  cefTRIAXone   IVPB 1000 milliGRAM(s) IV Intermittent every 24 hours  enoxaparin Injectable 40 milliGRAM(s) SubCutaneous every 24 hours  melatonin 10 milliGRAM(s) Oral at bedtime    MEDICATIONS  (PRN):  acetaminophen     Tablet .. 650 milliGRAM(s) Oral every 6 hours PRN Temp greater or equal to 38C (100.4F), Mild Pain (1 - 3)  cloNIDine 0.1 milliGRAM(s) Oral every 12 hours PRN Withdrawal symptoms  loperamide 2 milliGRAM(s) Oral every 4 hours PRN After each loose stool as needed for diarrhea  prochlorperazine   Tablet 10 milliGRAM(s) Oral every 8 hours PRN Nausea and/or Vomiting  QUEtiapine 50 milliGRAM(s) Oral at bedtime PRN agitation/restless      No Known Allergies      LABS                        8.9    7.26  )-----------( 428      ( 28 Mar 2023 05:30 )             27.5     03-28    139  |  108  |  11  ----------------------------<  122<H>  4.1   |  26  |  0.95    Ca    8.7      28 Mar 2023 05:30  Phos  2.5     03-28  Mg     1.9     03-28    TPro  6.6  /  Alb  2.4<L>  /  TBili  <0.2  /  DBili  x   /  AST  24  /  ALT  39  /  AlkPhos  93  03-28      Urinalysis Basic - ( 28 Mar 2023 01:20 )    Color: Yellow / Appearance: Cloudy / SG: >=1.030 / pH: x  Gluc: x / Ketone: NEGATIVE  / Bili: Negative / Urobili: 0.2 E.U./dL   Blood: x / Protein: >=300 mg/dL / Nitrite: NEGATIVE   Leuk Esterase: Trace / RBC: 5-10 /HPF / WBC > 10 /HPF   Sq Epi: x / Non Sq Epi: 0-5 /HPF / Bacteria: Present /HPF              IMAGING/EKG/ETC  Reviewed

## 2023-03-28 NOTE — BH CONSULTATION LIAISON ASSESSMENT NOTE - RISK ASSESSMENT
low acute risk: no SI/HI, future oriented thinking, multiple protective factors: partner, family, housing, employment; had chronic risk factors- substance abuse

## 2023-03-28 NOTE — PROGRESS NOTE ADULT - ASSESSMENT
31 y/o M with Hx of polysubstance abuse presented to ED with complaints of 1 weeks chest congestion, cough, SOB and subjective fevers and 2 days of LE edema found to have strep multifocal PNA with U/A suggestive of nephrotic process.

## 2023-03-28 NOTE — BH CONSULTATION LIAISON ASSESSMENT NOTE - NSBHTIMEACTIVITIESPERFORMED_PSY_A_CORE
33 y/o man with no PPH, long history of polysubstance abuse (opiates, cocaine, alcohol- social) who presented to ED with complaints of 1 weeks chest congestion, cough, SOB and subjective fevers and 2 days of LE edema found to have strep multifocal PNA with U/A suggestive of nephrotic process. Psychiatry was consulted to provide recommendations for opioid withdrawal. On assessment pt presents without any objective sx of opioid withdrawal, c/o back pain. See above for recs.

## 2023-03-28 NOTE — BH CONSULTATION LIAISON ASSESSMENT NOTE - CURRENT MEDICATION
MEDICATIONS  (STANDING):  cefTRIAXone   IVPB 1000 milliGRAM(s) IV Intermittent every 24 hours  enoxaparin Injectable 40 milliGRAM(s) SubCutaneous every 24 hours  melatonin 10 milliGRAM(s) Oral at bedtime    MEDICATIONS  (PRN):  acetaminophen     Tablet .. 650 milliGRAM(s) Oral every 6 hours PRN Temp greater or equal to 38C (100.4F), Mild Pain (1 - 3)  cloNIDine 0.1 milliGRAM(s) Oral every 12 hours PRN Withdrawal symptoms  loperamide 2 milliGRAM(s) Oral every 4 hours PRN After each loose stool as needed for diarrhea  prochlorperazine   Tablet 10 milliGRAM(s) Oral every 8 hours PRN Nausea and/or Vomiting  QUEtiapine 50 milliGRAM(s) Oral at bedtime PRN agitation/restless

## 2023-03-28 NOTE — BH CONSULTATION LIAISON ASSESSMENT NOTE - NSBHATTESTBILLING_PSY_A_CORE
99222-Initial OBS or IP - moderate complexity OR 55-74 mins 99306-Bfdgardlhj OBS or IP - low complexity OR 25-34 mins

## 2023-03-28 NOTE — SBIRT NOTE ADULT - NSSBIRTUNABLESCR_GEN_A_CORE
Patient declined SBIRT screening and does not want a follow-up visit from social work./Patient refused

## 2023-03-28 NOTE — BH CONSULTATION LIAISON ASSESSMENT NOTE - NSBHCHARTREVIEWVS_PSY_A_CORE FT
Vital Signs Last 24 Hrs  T(C): 36.6 (28 Mar 2023 13:00), Max: 36.9 (27 Mar 2023 17:56)  T(F): 97.8 (28 Mar 2023 13:00), Max: 98.4 (27 Mar 2023 17:56)  HR: 69 (28 Mar 2023 13:00) (57 - 93)  BP: 131/69 (28 Mar 2023 13:00) (131/69 - 163/96)  BP(mean): --  RR: 18 (28 Mar 2023 13:00) (17 - 18)  SpO2: 98% (28 Mar 2023 13:00) (97% - 100%)    Parameters below as of 28 Mar 2023 13:00  Patient On (Oxygen Delivery Method): room air

## 2023-03-28 NOTE — BH CONSULTATION LIAISON ASSESSMENT NOTE - SUMMARY
32 man with no PPH, long history of polysubstance abuse (opiates, cocaine, alcohol- social) who presented to ED with complaints of 1 weeks chest congestion, cough, SOB and subjective fevers and 2 days of LE edema found to have strep multifocal PNA with U/A suggestive of nephrotic process. Psychiatry was consulted to provide recommendations for opioid withdrawal. On assessment pt presents without any objective sx of opioid withdrawal, c/o back pain.   Plan:  -psychoeducation provided  -consider daily methadone 15mg po for taper vs maintenance (to be discussed with the pt)  -for breakthrough withdrawal sx (COWS>6) use methadone 5mg po q4h, max of 2doses per 24hr (daily max of 25mg methadone)  -continue symptomatic treatment with clonidine 0.1mg bid, loperamide, compazine, melatonin  -prns for agitation with seroquel 50mg po q6h prn  -cl to follow 33 y/o man with no PPH, long history of polysubstance abuse (opiates, cocaine, alcohol- social) who presented to ED with complaints of 1 weeks chest congestion, cough, SOB and subjective fevers and 2 days of LE edema found to have strep multifocal PNA with U/A suggestive of nephrotic process. Psychiatry was consulted to provide recommendations for opioid withdrawal. On assessment pt presents without any objective sx of opioid withdrawal, c/o back pain.   Plan:  -psychoeducation provided  -consider daily methadone 15mg po for taper vs maintenance (to be discussed with the pt)  -for breakthrough withdrawal sx (COWS>6) use methadone 5mg po q4h, max of 2doses per 24hr (daily max of 25mg methadone)  -continue symptomatic treatment with clonidine 0.1mg bid, loperamide, compazine, melatonin  -prns for agitation with seroquel 50mg po q6h prn  -cl to follow

## 2023-03-29 DIAGNOSIS — R80.9 PROTEINURIA, UNSPECIFIED: ICD-10-CM

## 2023-03-29 DIAGNOSIS — J02.0 STREPTOCOCCAL PHARYNGITIS: ICD-10-CM

## 2023-03-29 LAB
ALBUMIN SERPL ELPH-MCNC: 2.5 G/DL — LOW (ref 3.3–5)
ALP SERPL-CCNC: 80 U/L — SIGNIFICANT CHANGE UP (ref 40–120)
ALT FLD-CCNC: 32 U/L — SIGNIFICANT CHANGE UP (ref 10–45)
ANION GAP SERPL CALC-SCNC: 7 MMOL/L — SIGNIFICANT CHANGE UP (ref 5–17)
AST SERPL-CCNC: 21 U/L — SIGNIFICANT CHANGE UP (ref 10–40)
BASOPHILS # BLD AUTO: 0.09 K/UL — SIGNIFICANT CHANGE UP (ref 0–0.2)
BASOPHILS NFR BLD AUTO: 1.2 % — SIGNIFICANT CHANGE UP (ref 0–2)
BILIRUB SERPL-MCNC: <0.2 MG/DL — SIGNIFICANT CHANGE UP (ref 0.2–1.2)
BUN SERPL-MCNC: 13 MG/DL — SIGNIFICANT CHANGE UP (ref 7–23)
CALCIUM SERPL-MCNC: 8.8 MG/DL — SIGNIFICANT CHANGE UP (ref 8.4–10.5)
CHLORIDE SERPL-SCNC: 108 MMOL/L — SIGNIFICANT CHANGE UP (ref 96–108)
CO2 SERPL-SCNC: 26 MMOL/L — SIGNIFICANT CHANGE UP (ref 22–31)
CREAT SERPL-MCNC: 1.26 MG/DL — SIGNIFICANT CHANGE UP (ref 0.5–1.3)
EGFR: 77 ML/MIN/1.73M2 — SIGNIFICANT CHANGE UP
EOSINOPHIL # BLD AUTO: 0.29 K/UL — SIGNIFICANT CHANGE UP (ref 0–0.5)
EOSINOPHIL NFR BLD AUTO: 3.9 % — SIGNIFICANT CHANGE UP (ref 0–6)
GLUCOSE SERPL-MCNC: 79 MG/DL — SIGNIFICANT CHANGE UP (ref 70–99)
HCT VFR BLD CALC: 27.6 % — LOW (ref 39–50)
HCV AB S/CO SERPL IA: 0.12 S/CO — SIGNIFICANT CHANGE UP (ref 0–0.99)
HCV AB SERPL-IMP: SIGNIFICANT CHANGE UP
HGB BLD-MCNC: 8.6 G/DL — LOW (ref 13–17)
IMM GRANULOCYTES NFR BLD AUTO: 0.1 % — SIGNIFICANT CHANGE UP (ref 0–0.9)
LYMPHOCYTES # BLD AUTO: 3.28 K/UL — SIGNIFICANT CHANGE UP (ref 1–3.3)
LYMPHOCYTES # BLD AUTO: 44.6 % — HIGH (ref 13–44)
MAGNESIUM SERPL-MCNC: 1.9 MG/DL — SIGNIFICANT CHANGE UP (ref 1.6–2.6)
MCHC RBC-ENTMCNC: 28.6 PG — SIGNIFICANT CHANGE UP (ref 27–34)
MCHC RBC-ENTMCNC: 31.2 GM/DL — LOW (ref 32–36)
MCV RBC AUTO: 91.7 FL — SIGNIFICANT CHANGE UP (ref 80–100)
MONOCYTES # BLD AUTO: 0.55 K/UL — SIGNIFICANT CHANGE UP (ref 0–0.9)
MONOCYTES NFR BLD AUTO: 7.5 % — SIGNIFICANT CHANGE UP (ref 2–14)
NEUTROPHILS # BLD AUTO: 3.13 K/UL — SIGNIFICANT CHANGE UP (ref 1.8–7.4)
NEUTROPHILS NFR BLD AUTO: 42.7 % — LOW (ref 43–77)
NRBC # BLD: 0 /100 WBCS — SIGNIFICANT CHANGE UP (ref 0–0)
PHOSPHATE SERPL-MCNC: 4.2 MG/DL — SIGNIFICANT CHANGE UP (ref 2.5–4.5)
PLATELET # BLD AUTO: 360 K/UL — SIGNIFICANT CHANGE UP (ref 150–400)
POTASSIUM SERPL-MCNC: 4.6 MMOL/L — SIGNIFICANT CHANGE UP (ref 3.5–5.3)
POTASSIUM SERPL-SCNC: 4.6 MMOL/L — SIGNIFICANT CHANGE UP (ref 3.5–5.3)
PROT SERPL-MCNC: 6.2 G/DL — SIGNIFICANT CHANGE UP (ref 6–8.3)
PROT SERPL-MCNC: 6.7 G/DL — SIGNIFICANT CHANGE UP (ref 6–8.3)
PROT SERPL-MCNC: 6.7 G/DL — SIGNIFICANT CHANGE UP (ref 6–8.3)
RBC # BLD: 3.01 M/UL — LOW (ref 4.2–5.8)
RBC # FLD: 13.8 % — SIGNIFICANT CHANGE UP (ref 10.3–14.5)
SODIUM SERPL-SCNC: 141 MMOL/L — SIGNIFICANT CHANGE UP (ref 135–145)
TOTAL HEM COMP BLD-ACNC: 23 U/ML — LOW (ref 42–95)
WBC # BLD: 7.35 K/UL — SIGNIFICANT CHANGE UP (ref 3.8–10.5)
WBC # FLD AUTO: 7.35 K/UL — SIGNIFICANT CHANGE UP (ref 3.8–10.5)

## 2023-03-29 PROCEDURE — 99222 1ST HOSP IP/OBS MODERATE 55: CPT | Mod: GC

## 2023-03-29 PROCEDURE — 99231 SBSQ HOSP IP/OBS SF/LOW 25: CPT

## 2023-03-29 PROCEDURE — 76705 ECHO EXAM OF ABDOMEN: CPT | Mod: 26

## 2023-03-29 PROCEDURE — 99233 SBSQ HOSP IP/OBS HIGH 50: CPT | Mod: GC

## 2023-03-29 RX ORDER — LIDOCAINE 4 G/100G
1 CREAM TOPICAL ONCE
Refills: 0 | Status: COMPLETED | OUTPATIENT
Start: 2023-03-29 | End: 2023-03-29

## 2023-03-29 RX ADMIN — Medication 650 MILLIGRAM(S): at 12:14

## 2023-03-29 RX ADMIN — ENOXAPARIN SODIUM 40 MILLIGRAM(S): 100 INJECTION SUBCUTANEOUS at 21:08

## 2023-03-29 RX ADMIN — QUETIAPINE FUMARATE 50 MILLIGRAM(S): 200 TABLET, FILM COATED ORAL at 22:55

## 2023-03-29 RX ADMIN — Medication 650 MILLIGRAM(S): at 01:00

## 2023-03-29 RX ADMIN — LIDOCAINE 1 PATCH: 4 CREAM TOPICAL at 11:13

## 2023-03-29 RX ADMIN — Medication 1 TABLET(S): at 17:37

## 2023-03-29 RX ADMIN — LIDOCAINE 1 PATCH: 4 CREAM TOPICAL at 23:44

## 2023-03-29 RX ADMIN — Medication 10 MILLIGRAM(S): at 01:19

## 2023-03-29 RX ADMIN — ENOXAPARIN SODIUM 40 MILLIGRAM(S): 100 INJECTION SUBCUTANEOUS at 00:00

## 2023-03-29 RX ADMIN — Medication 650 MILLIGRAM(S): at 11:14

## 2023-03-29 RX ADMIN — LIDOCAINE 1 PATCH: 4 CREAM TOPICAL at 00:00

## 2023-03-29 RX ADMIN — LIDOCAINE 1 PATCH: 4 CREAM TOPICAL at 18:32

## 2023-03-29 RX ADMIN — Medication 650 MILLIGRAM(S): at 02:00

## 2023-03-29 RX ADMIN — Medication 650 MILLIGRAM(S): at 17:37

## 2023-03-29 RX ADMIN — Medication 650 MILLIGRAM(S): at 18:37

## 2023-03-29 NOTE — BH CONSULTATION LIAISON PROGRESS NOTE - NSBHATTESTCOMMENTATTENDFT_PSY_A_CORE
33 y/o man with no PPH, long history of polysubstance abuse (opiates, cocaine, alcohol- social) who presented to ED with complaints of 1 weeks chest congestion, cough, SOB and subjective fevers and 2 days of LE edema found to have strep multifocal PNA with U/A suggestive of nephrotic process. Psychiatry was consulted to provide recommendations for opioid withdrawal. On assessment pt presents without any objective sx of opioid withdrawal, c/o back pain.   Plan:  -psychoeducation provided  -daily methadone 15mg po for maintenance   -for breakthrough withdrawal sx (COWS>6) use methadone 5mg po q4h, max of 2doses per 24hr (daily max of 25mg methadone)  -continue symptomatic treatment with clonidine 0.1mg bid, loperamide, compazine, melatonin  -prns for agitation with seroquel 50mg po q6h prn  -cl to follow as needed, please call with questions/concerns

## 2023-03-29 NOTE — BH CONSULTATION LIAISON PROGRESS NOTE - NSBHCHARTREVIEWVS_PSY_A_CORE FT
Vital Signs Last 24 Hrs  T(C): 36.6 (29 Mar 2023 13:26), Max: 37 (29 Mar 2023 05:29)  T(F): 97.8 (29 Mar 2023 13:26), Max: 98.6 (29 Mar 2023 05:29)  HR: 64 (29 Mar 2023 13:26) (64 - 82)  BP: 124/75 (29 Mar 2023 13:26) (113/65 - 132/74)  BP(mean): --  RR: 16 (29 Mar 2023 13:26) (16 - 18)  SpO2: 96% (29 Mar 2023 13:26) (93% - 98%)    Parameters below as of 29 Mar 2023 13:26  Patient On (Oxygen Delivery Method): room air

## 2023-03-29 NOTE — PROGRESS NOTE ADULT - SUBJECTIVE AND OBJECTIVE BOX
O/N Events: Sleeping, COWS = 0     Subjective/ROS: Patient seen and examined at bedside. No acute complaints except back pain. Withdrawl improving. Appetite improved.     Denies Fever/Chills, HA, CP, SOB, n/v, changes in bowel/urinary habits.  12pt ROS otherwise negative.    VITALS  Vital Signs Last 24 Hrs  T(C): 36.6 (29 Mar 2023 13:26), Max: 37 (29 Mar 2023 05:29)  T(F): 97.8 (29 Mar 2023 13:26), Max: 98.6 (29 Mar 2023 05:29)  HR: 64 (29 Mar 2023 13:26) (64 - 82)  BP: 124/75 (29 Mar 2023 13:26) (113/65 - 132/74)  BP(mean): --  RR: 16 (29 Mar 2023 13:26) (16 - 18)  SpO2: 96% (29 Mar 2023 13:26) (93% - 98%)    Parameters below as of 29 Mar 2023 13:26  Patient On (Oxygen Delivery Method): room air        CAPILLARY BLOOD GLUCOSE          PHYSICAL EXAM  GENERAL: In bed, in normal clothes, NAD   HEAD:  NC/AT, MMM   EYES: Pupils wnl   NECK: Supple, No JVD  CHEST/LUNG: Clear to auscultation bilaterally; No w/c/r   HEART: S1, S2 wnl, RRR; No murmurs, rubs, or gallops  ABDOMEN: Bowel sounds wnl, normoactive, soft, NTND. No masses   EXTREMITIES:  2+ Peripheral Pulses, brisk capillary refill. No clubbing, cyanosis, or edema  NERVOUS SYSTEM:  Alert & Oriented X3, No focal deficits   MSK: Lower back pain mildly TTP     MEDICATIONS  (STANDING):  amoxicillin  875 milliGRAM(s)/clavulanate 1 Tablet(s) Oral every 12 hours  enoxaparin Injectable 40 milliGRAM(s) SubCutaneous every 24 hours  melatonin 10 milliGRAM(s) Oral at bedtime    MEDICATIONS  (PRN):  acetaminophen     Tablet .. 650 milliGRAM(s) Oral every 6 hours PRN Temp greater or equal to 38C (100.4F), Mild Pain (1 - 3)  cloNIDine 0.1 milliGRAM(s) Oral every 12 hours PRN Withdrawal symptoms  loperamide 2 milliGRAM(s) Oral every 4 hours PRN After each loose stool as needed for diarrhea  methadone    Tablet 5 milliGRAM(s) Oral every 4 hours PRN Severe Pain (7 - 10)  prochlorperazine   Tablet 10 milliGRAM(s) Oral every 8 hours PRN Nausea and/or Vomiting  QUEtiapine 50 milliGRAM(s) Oral every 6 hours PRN agitation/restless      No Known Allergies      LABS                        8.6    7.35  )-----------( 360      ( 29 Mar 2023 08:52 )             27.6     03-29    141  |  108  |  13  ----------------------------<  79  4.6   |  26  |  1.26    Ca    8.8      29 Mar 2023 08:52  Phos  4.2     03-29  Mg     1.9     03-29    TPro  6.2  /  Alb  2.5<L>  /  TBili  <0.2  /  DBili  x   /  AST  21  /  ALT  32  /  AlkPhos  80  03-29      Urinalysis Basic - ( 28 Mar 2023 01:20 )    Color: Yellow / Appearance: Cloudy / SG: >=1.030 / pH: x  Gluc: x / Ketone: NEGATIVE  / Bili: Negative / Urobili: 0.2 E.U./dL   Blood: x / Protein: >=300 mg/dL / Nitrite: NEGATIVE   Leuk Esterase: Trace / RBC: 5-10 /HPF / WBC > 10 /HPF   Sq Epi: x / Non Sq Epi: 0-5 /HPF / Bacteria: Present /HPF              IMAGING/EKG/ETC  Reviewed

## 2023-03-29 NOTE — PROGRESS NOTE ADULT - SUBJECTIVE AND OBJECTIVE BOX
Patient is a 32y old  Male who presents with a chief complaint of Opioid withdrawal, PNA (29 Mar 2023 14:58)      INTERVAL HPI/OVERNIGHT EVENTS:    Pt. seen and examined earlier today  Pt. reports feeling much better today, increased energy  Pt. denies F/C, CP, SOB, cough, sore throat  No signs/sx of opioid withdrawal, COWS 0, has not required PRNs    Review of Systems: 12 point review of systems otherwise negative    MEDICATIONS  (STANDING):  amoxicillin  875 milliGRAM(s)/clavulanate 1 Tablet(s) Oral every 12 hours  enoxaparin Injectable 40 milliGRAM(s) SubCutaneous every 24 hours  melatonin 10 milliGRAM(s) Oral at bedtime    MEDICATIONS  (PRN):  acetaminophen     Tablet .. 650 milliGRAM(s) Oral every 6 hours PRN Temp greater or equal to 38C (100.4F), Mild Pain (1 - 3)  cloNIDine 0.1 milliGRAM(s) Oral every 12 hours PRN Withdrawal symptoms  loperamide 2 milliGRAM(s) Oral every 4 hours PRN After each loose stool as needed for diarrhea  methadone    Tablet 5 milliGRAM(s) Oral every 4 hours PRN Severe Pain (7 - 10)  prochlorperazine   Tablet 10 milliGRAM(s) Oral every 8 hours PRN Nausea and/or Vomiting  QUEtiapine 50 milliGRAM(s) Oral every 6 hours PRN agitation/restless      Allergies    No Known Allergies    Intolerances          Vital Signs Last 24 Hrs  T(C): 36.7 (29 Mar 2023 17:10), Max: 37 (29 Mar 2023 05:29)  T(F): 98 (29 Mar 2023 17:10), Max: 98.6 (29 Mar 2023 05:29)  HR: 87 (29 Mar 2023 17:10) (64 - 87)  BP: 130/76 (29 Mar 2023 17:10) (113/65 - 130/76)  BP(mean): --  RR: 17 (29 Mar 2023 17:10) (16 - 18)  SpO2: 96% (29 Mar 2023 17:10) (93% - 98%)    Parameters below as of 29 Mar 2023 17:10  Patient On (Oxygen Delivery Method): room air      CAPILLARY BLOOD GLUCOSE          03-28 @ 07:01  -  03-29 @ 07:00  --------------------------------------------------------  IN: 50 mL / OUT: 0 mL / NET: 50 mL    03-29 @ 07:01  - 03-29 @ 17:22  --------------------------------------------------------  IN: 1440 mL / OUT: 300 mL / NET: 1140 mL        Physical Exam:  (earlier today)  Daily     Daily   General:  comfortable-appearing in NAD, calm, not yawning  HEENT:  MMM +pharyngeal erythema but no exudates  Extremities:  no edema B/L LE  Skin:  WWP, no visible goosebumps   Neuro:  AAOx3  rest of exam per PGY-1    LABS:                        8.6    7.35  )-----------( 360      ( 29 Mar 2023 08:52 )             27.6     03-29    141  |  108  |  13  ----------------------------<  79  4.6   |  26  |  1.26    Ca    8.8      29 Mar 2023 08:52  Phos  4.2     03-29  Mg     1.9     03-29    TPro  6.2  /  Alb  2.5<L>  /  TBili  <0.2  /  DBili  x   /  AST  21  /  ALT  32  /  AlkPhos  80  03-29      Urinalysis Basic - ( 28 Mar 2023 01:20 )    Color: Yellow / Appearance: Cloudy / SG: >=1.030 / pH: x  Gluc: x / Ketone: NEGATIVE  / Bili: Negative / Urobili: 0.2 E.U./dL   Blood: x / Protein: >=300 mg/dL / Nitrite: NEGATIVE   Leuk Esterase: Trace / RBC: 5-10 /HPF / WBC > 10 /HPF   Sq Epi: x / Non Sq Epi: 0-5 /HPF / Bacteria: Present /HPF

## 2023-03-29 NOTE — BH CONSULTATION LIAISON PROGRESS NOTE - CURRENT MEDICATION
MEDICATIONS  (STANDING):  amoxicillin  875 milliGRAM(s)/clavulanate 1 Tablet(s) Oral every 12 hours  enoxaparin Injectable 40 milliGRAM(s) SubCutaneous every 24 hours  melatonin 10 milliGRAM(s) Oral at bedtime    MEDICATIONS  (PRN):  acetaminophen     Tablet .. 650 milliGRAM(s) Oral every 6 hours PRN Temp greater or equal to 38C (100.4F), Mild Pain (1 - 3)  cloNIDine 0.1 milliGRAM(s) Oral every 12 hours PRN Withdrawal symptoms  loperamide 2 milliGRAM(s) Oral every 4 hours PRN After each loose stool as needed for diarrhea  methadone    Tablet 5 milliGRAM(s) Oral every 4 hours PRN Severe Pain (7 - 10)  prochlorperazine   Tablet 10 milliGRAM(s) Oral every 8 hours PRN Nausea and/or Vomiting  QUEtiapine 50 milliGRAM(s) Oral every 6 hours PRN agitation/restless

## 2023-03-29 NOTE — BH CONSULTATION LIAISON PROGRESS NOTE - NSBHCONSULTFOLLOWAFTERCARE_PSY_A_CORE FT
•	[facility]  o	[email or web address]  o	Comprehensive addiction treatment services, including psychopharm, psychotherapy, drug testing, as well as eating disorder treatment, and specialized treatment for LGBTQ, Congregational Jehovah's witness populations  o	Walk Ins Mon-Fri 9am-2pm.   o	Two locations  ?	[address] [place] [address], [address] [facility] [address] [phone]  ?	[address] [place] [address] [phone]

## 2023-03-29 NOTE — PROGRESS NOTE ADULT - ASSESSMENT
33 y/o M with Hx of polysubstance abuse (utox + for cocaine and opiates) presented to the ED with complaints of cough/shortness of breath fevers and LE edema found to be strep + w/ elevated antistreptolysin Ab nephrology consulted    Assessment/Plan:   #non-oliguric MILDRED   UA w/protein and blood   uPCR 1.5  unclear baseline creatinine  etiology of MILDRED includes:  post-strep GN given elevated antistreptolysin ab  pre-renal improving with IVF      Recommend:   please obtain  C-ANCA P-ANCA MPO Proteinase-3 PLA2R serum immunoelectrophoresis MARC DsDNA  if edema persists or worsens would give trial of lasix  if BP remains elevated even after patient no longer withdrawing would start an ace-i or arb  depending on serology results will possibly need a kidney bx  daily BMP  strict I/Os   renal diet     Thank you for the opportunity to participate in the care of your patient. The nephrology service remains available to assist with any questions or concerns. Please feel free to reach us by paging the on-call nephrology fellow for urgent issues or as below.   Imani Pritchard D.O  PGY-5, Nephrology Fellow   [phone]

## 2023-03-29 NOTE — PROGRESS NOTE ADULT - SUBJECTIVE AND OBJECTIVE BOX
Patient is a 32y Male seen and evaluated at bedside. patient states that he is feeling better this AM- some serologies came back C3 low at 15 C4 low-normal SCr 1.2 K 4,6 biabr 26 patient states that he feels better       Meds:    acetaminophen     Tablet .. 650 every 6 hours PRN  cefTRIAXone   IVPB 1000 every 24 hours  cloNIDine 0.1 every 12 hours PRN  enoxaparin Injectable 40 every 24 hours  loperamide 2 every 4 hours PRN  melatonin 10 at bedtime  methadone    Tablet 5 every 4 hours PRN  prochlorperazine   Tablet 10 every 8 hours PRN  QUEtiapine 50 every 6 hours PRN      T(C): , Max: 37 (03-29-23 @ 05:29)  T(F): , Max: 98.6 (03-29-23 @ 05:29)  HR: 72 (03-29-23 @ 08:50)  BP: 113/65 (03-29-23 @ 08:50)  BP(mean): --  RR: 16 (03-29-23 @ 08:50)  SpO2: 98% (03-29-23 @ 08:50)  Wt(kg): --    03-28 @ 07:01  -  03-29 @ 07:00  --------------------------------------------------------  IN: 50 mL / OUT: 0 mL / NET: 50 mL    03-29 @ 07:01  -  03-29 @ 12:59  --------------------------------------------------------  IN: 720 mL / OUT: 300 mL / NET: 420 mL          Review of Systems:  all other ROS negative     PHYSICAL EXAM:  GENERAL: well-developed, well nourished, alert, no acute distress at present  CHEST/LUNG: Clear to auscultation bilaterally  HEART: normal S1S2, RRR  ABDOMEN: Soft, Nontender, +BS,   EXTREMITIES: No clubbing, cyanosis, or edema         LABS:                        8.6    7.35  )-----------( 360      ( 29 Mar 2023 08:52 )             27.6     03-29    141  |  108  |  13  ----------------------------<  79  4.6   |  26  |  1.26    Ca    8.8      29 Mar 2023 08:52  Phos  4.2     03-29  Mg     1.9     03-29    TPro  6.2  /  Alb  2.5<L>  /  TBili  <0.2  /  DBili  x   /  AST  21  /  ALT  32  /  AlkPhos  80  03-29        Urinalysis Basic - ( 28 Mar 2023 01:20 )    Color: Yellow / Appearance: Cloudy / SG: >=1.030 / pH: x  Gluc: x / Ketone: NEGATIVE  / Bili: Negative / Urobili: 0.2 E.U./dL   Blood: x / Protein: >=300 mg/dL / Nitrite: NEGATIVE   Leuk Esterase: Trace / RBC: 5-10 /HPF / WBC > 10 /HPF   Sq Epi: x / Non Sq Epi: 0-5 /HPF / Bacteria: Present /HPF      Sodium, Random Urine: 115 mmol/L (03-28 @ 01:30)  Creatinine, Random Urine: 211 mg/dL (03-28 @ 01:30)  Protein/Creatinine Ratio Calculation: 1.3 Ratio (03-28 @ 01:30)        RADIOLOGY & ADDITIONAL STUDIES:           Patient is a 32y Male seen and evaluated at bedside. patient states that he is feeling better this AM- some serologies came back C3 low at 15 C4 low-normal SCr 1.2 K 4,6 biabr 26 patient states that he feels better and his congestion symptoms are better       Meds:    acetaminophen     Tablet .. 650 every 6 hours PRN  cefTRIAXone   IVPB 1000 every 24 hours  cloNIDine 0.1 every 12 hours PRN  enoxaparin Injectable 40 every 24 hours  loperamide 2 every 4 hours PRN  melatonin 10 at bedtime  methadone    Tablet 5 every 4 hours PRN  prochlorperazine   Tablet 10 every 8 hours PRN  QUEtiapine 50 every 6 hours PRN      T(C): , Max: 37 (03-29-23 @ 05:29)  T(F): , Max: 98.6 (03-29-23 @ 05:29)  HR: 72 (03-29-23 @ 08:50)  BP: 113/65 (03-29-23 @ 08:50)  BP(mean): --  RR: 16 (03-29-23 @ 08:50)  SpO2: 98% (03-29-23 @ 08:50)  Wt(kg): --    03-28 @ 07:01  -  03-29 @ 07:00  --------------------------------------------------------  IN: 50 mL / OUT: 0 mL / NET: 50 mL    03-29 @ 07:01  -  03-29 @ 12:59  --------------------------------------------------------  IN: 720 mL / OUT: 300 mL / NET: 420 mL          Review of Systems:  all other ROS negative     PHYSICAL EXAM:  GENERAL: well-developed, well nourished, alert, no acute distress at present  CHEST/LUNG: Clear to auscultation bilaterally  HEART: normal S1S2, RRR  ABDOMEN: Soft, Nontender, +BS,   EXTREMITIES: No clubbing, cyanosis, or edema         LABS:                        8.6    7.35  )-----------( 360      ( 29 Mar 2023 08:52 )             27.6     03-29    141  |  108  |  13  ----------------------------<  79  4.6   |  26  |  1.26    Ca    8.8      29 Mar 2023 08:52  Phos  4.2     03-29  Mg     1.9     03-29    TPro  6.2  /  Alb  2.5<L>  /  TBili  <0.2  /  DBili  x   /  AST  21  /  ALT  32  /  AlkPhos  80  03-29        Urinalysis Basic - ( 28 Mar 2023 01:20 )    Color: Yellow / Appearance: Cloudy / SG: >=1.030 / pH: x  Gluc: x / Ketone: NEGATIVE  / Bili: Negative / Urobili: 0.2 E.U./dL   Blood: x / Protein: >=300 mg/dL / Nitrite: NEGATIVE   Leuk Esterase: Trace / RBC: 5-10 /HPF / WBC > 10 /HPF   Sq Epi: x / Non Sq Epi: 0-5 /HPF / Bacteria: Present /HPF      Sodium, Random Urine: 115 mmol/L (03-28 @ 01:30)  Creatinine, Random Urine: 211 mg/dL (03-28 @ 01:30)  Protein/Creatinine Ratio Calculation: 1.3 Ratio (03-28 @ 01:30)        RADIOLOGY & ADDITIONAL STUDIES:

## 2023-03-29 NOTE — BH CONSULTATION LIAISON PROGRESS NOTE - NSBHFUPINTERVALHXFT_PSY_A_CORE
Chart reviewed and patient evaluated. Clonidine 0.1mg given on 3/28 at 1533 for symptoms of withdrawal. Acetaminophen PRN given overnight and in the morning for back pain related to withdrawal.  Upon evaluation, patient is AAOx4 and states his mood is "so-so." Patient states that over the last two days that he has been experiencing withdrawals related to chronic opioid use. He endorsed previous symptoms of increased heart rate, anxiety, sweating, restlessness, back aches, GI cramping, and tremors. He states that he has ongoing back aches which he reports experiencing in the past from other withdrawal episodes and some anxiety. Current COWS assessment score is 2. Patient attributes current anxiety to mild cravings for opioids. He also attributes his symptomatic relief of withdrawals to methadone he was prescribed. Denies needing any PRN methadone. Reports that he used Suboxone 8mg/2mg BID in the past, but had side effects of "head in the clouds" which interfered with function at work. States that he also used buprenorphine which also gave him "dissociation" but to a lesser extent than Suboxone. He states that he is open to continuing Methadone on an outpatient basis. Patient denies any delusions, paranoia, or AVH. Patient denies any current SI/HI, intent, or plan.

## 2023-03-29 NOTE — BH CONSULTATION LIAISON PROGRESS NOTE - NSBHCONSULTRECOMMENDOTHER_PSY_A_CORE FT
Plan:  -psychoeducation provided  -consider daily methadone 15mg po for taper vs maintenance (to be discussed with the pt)  -for breakthrough withdrawal sx (COWS>6) use methadone 5mg po q4h, max of 2doses per 24hr (daily max of 25mg methadone)  -continue symptomatic treatment with clonidine 0.1mg bid, loperamide, compazine, melatonin  -prns for agitation with seroquel 50mg po q6h prn  -cl to follow

## 2023-03-30 LAB
ALBUMIN SERPL ELPH-MCNC: 2.5 G/DL — LOW (ref 3.3–5)
ALP SERPL-CCNC: 71 U/L — SIGNIFICANT CHANGE UP (ref 40–120)
ALT FLD-CCNC: 26 U/L — SIGNIFICANT CHANGE UP (ref 10–45)
ANION GAP SERPL CALC-SCNC: 7 MMOL/L — SIGNIFICANT CHANGE UP (ref 5–17)
AST SERPL-CCNC: 17 U/L — SIGNIFICANT CHANGE UP (ref 10–40)
AUTO DIFF PNL BLD: NEGATIVE — SIGNIFICANT CHANGE UP
BASOPHILS # BLD AUTO: 0.08 K/UL — SIGNIFICANT CHANGE UP (ref 0–0.2)
BASOPHILS NFR BLD AUTO: 1.2 % — SIGNIFICANT CHANGE UP (ref 0–2)
BILIRUB SERPL-MCNC: <0.2 MG/DL — SIGNIFICANT CHANGE UP (ref 0.2–1.2)
BUN SERPL-MCNC: 18 MG/DL — SIGNIFICANT CHANGE UP (ref 7–23)
C-ANCA SER-ACNC: NEGATIVE — SIGNIFICANT CHANGE UP
CALCIUM SERPL-MCNC: 8.7 MG/DL — SIGNIFICANT CHANGE UP (ref 8.4–10.5)
CHLORIDE SERPL-SCNC: 104 MMOL/L — SIGNIFICANT CHANGE UP (ref 96–108)
CO2 SERPL-SCNC: 28 MMOL/L — SIGNIFICANT CHANGE UP (ref 22–31)
CREAT SERPL-MCNC: 1.16 MG/DL — SIGNIFICANT CHANGE UP (ref 0.5–1.3)
EGFR: 85 ML/MIN/1.73M2 — SIGNIFICANT CHANGE UP
EOSINOPHIL # BLD AUTO: 0.35 K/UL — SIGNIFICANT CHANGE UP (ref 0–0.5)
EOSINOPHIL NFR BLD AUTO: 5.1 % — SIGNIFICANT CHANGE UP (ref 0–6)
GLUCOSE SERPL-MCNC: 80 MG/DL — SIGNIFICANT CHANGE UP (ref 70–99)
HCT VFR BLD CALC: 28.6 % — LOW (ref 39–50)
HGB BLD-MCNC: 8.9 G/DL — LOW (ref 13–17)
IMM GRANULOCYTES NFR BLD AUTO: 0.1 % — SIGNIFICANT CHANGE UP (ref 0–0.9)
LYMPHOCYTES # BLD AUTO: 2.98 K/UL — SIGNIFICANT CHANGE UP (ref 1–3.3)
LYMPHOCYTES # BLD AUTO: 43.4 % — SIGNIFICANT CHANGE UP (ref 13–44)
MAGNESIUM SERPL-MCNC: 2 MG/DL — SIGNIFICANT CHANGE UP (ref 1.6–2.6)
MCHC RBC-ENTMCNC: 28.2 PG — SIGNIFICANT CHANGE UP (ref 27–34)
MCHC RBC-ENTMCNC: 31.1 GM/DL — LOW (ref 32–36)
MCV RBC AUTO: 90.5 FL — SIGNIFICANT CHANGE UP (ref 80–100)
MONOCYTES # BLD AUTO: 0.59 K/UL — SIGNIFICANT CHANGE UP (ref 0–0.9)
MONOCYTES NFR BLD AUTO: 8.6 % — SIGNIFICANT CHANGE UP (ref 2–14)
MPO AB + PR3 PNL SER: SIGNIFICANT CHANGE UP
NEUTROPHILS # BLD AUTO: 2.86 K/UL — SIGNIFICANT CHANGE UP (ref 1.8–7.4)
NEUTROPHILS NFR BLD AUTO: 41.6 % — LOW (ref 43–77)
NRBC # BLD: 0 /100 WBCS — SIGNIFICANT CHANGE UP (ref 0–0)
P-ANCA SER-ACNC: NEGATIVE — SIGNIFICANT CHANGE UP
PHOSPHATE SERPL-MCNC: 4.3 MG/DL — SIGNIFICANT CHANGE UP (ref 2.5–4.5)
PLATELET # BLD AUTO: 380 K/UL — SIGNIFICANT CHANGE UP (ref 150–400)
POTASSIUM SERPL-MCNC: 4.6 MMOL/L — SIGNIFICANT CHANGE UP (ref 3.5–5.3)
POTASSIUM SERPL-SCNC: 4.6 MMOL/L — SIGNIFICANT CHANGE UP (ref 3.5–5.3)
PROT SERPL-MCNC: 5.9 G/DL — LOW (ref 6–8.3)
RBC # BLD: 3.16 M/UL — LOW (ref 4.2–5.8)
RBC # FLD: 13.5 % — SIGNIFICANT CHANGE UP (ref 10.3–14.5)
SODIUM SERPL-SCNC: 139 MMOL/L — SIGNIFICANT CHANGE UP (ref 135–145)
WBC # BLD: 6.87 K/UL — SIGNIFICANT CHANGE UP (ref 3.8–10.5)
WBC # FLD AUTO: 6.87 K/UL — SIGNIFICANT CHANGE UP (ref 3.8–10.5)

## 2023-03-30 PROCEDURE — 99232 SBSQ HOSP IP/OBS MODERATE 35: CPT | Mod: GC

## 2023-03-30 PROCEDURE — 99233 SBSQ HOSP IP/OBS HIGH 50: CPT | Mod: GC

## 2023-03-30 RX ORDER — FUROSEMIDE 40 MG
20 TABLET ORAL ONCE
Refills: 0 | Status: COMPLETED | OUTPATIENT
Start: 2023-03-30 | End: 2023-03-30

## 2023-03-30 RX ADMIN — Medication 650 MILLIGRAM(S): at 06:55

## 2023-03-30 RX ADMIN — Medication 1 TABLET(S): at 17:59

## 2023-03-30 RX ADMIN — Medication 20 MILLIGRAM(S): at 14:11

## 2023-03-30 RX ADMIN — Medication 650 MILLIGRAM(S): at 05:55

## 2023-03-30 RX ADMIN — ENOXAPARIN SODIUM 40 MILLIGRAM(S): 100 INJECTION SUBCUTANEOUS at 22:08

## 2023-03-30 RX ADMIN — Medication 1 TABLET(S): at 05:59

## 2023-03-30 NOTE — PROGRESS NOTE ADULT - SUBJECTIVE AND OBJECTIVE BOX
Patient is a 32y old  Male who presents with a chief complaint of PNA and Opioid Withdrawal (30 Mar 2023 14:07)      INTERVAL HPI/OVERNIGHT EVENTS:    Pt. seen and examined earlier today  Pt. c/o worsening leg swelling, gravity-dependent, up to thighs  Pt. otherwise feels well; he denies F/C, CP, SOB, cough, sore throat    Review of Systems: 12 point review of systems otherwise negative    MEDICATIONS  (STANDING):  amoxicillin  875 milliGRAM(s)/clavulanate 1 Tablet(s) Oral every 12 hours  enoxaparin Injectable 40 milliGRAM(s) SubCutaneous every 24 hours  melatonin 10 milliGRAM(s) Oral at bedtime    MEDICATIONS  (PRN):  acetaminophen     Tablet .. 650 milliGRAM(s) Oral every 6 hours PRN Temp greater or equal to 38C (100.4F), Mild Pain (1 - 3)  cloNIDine 0.1 milliGRAM(s) Oral every 12 hours PRN Withdrawal symptoms  loperamide 2 milliGRAM(s) Oral every 4 hours PRN After each loose stool as needed for diarrhea  methadone    Tablet 5 milliGRAM(s) Oral every 4 hours PRN Severe Pain (7 - 10)  prochlorperazine   Tablet 10 milliGRAM(s) Oral every 8 hours PRN Nausea and/or Vomiting  QUEtiapine 50 milliGRAM(s) Oral every 6 hours PRN agitation/restless      Allergies    No Known Allergies    Intolerances          Vital Signs Last 24 Hrs  T(C): 36.9 (30 Mar 2023 16:25), Max: 37 (29 Mar 2023 21:13)  T(F): 98.5 (30 Mar 2023 16:25), Max: 98.6 (29 Mar 2023 21:13)  HR: 99 (30 Mar 2023 16:25) (69 - 99)  BP: 150/85 (30 Mar 2023 16:25) (139/96 - 154/85)  BP(mean): --  RR: 18 (30 Mar 2023 16:25) (16 - 18)  SpO2: 99% (30 Mar 2023 16:25) (96% - 100%)    Parameters below as of 30 Mar 2023 16:25  Patient On (Oxygen Delivery Method): room air      CAPILLARY BLOOD GLUCOSE          03-29 @ 07:01  -  03-30 @ 07:00  --------------------------------------------------------  IN: 1780 mL / OUT: 300 mL / NET: 1480 mL    03-30 @ 07:01  - 03-30 @ 18:20  --------------------------------------------------------  IN: 1640 mL / OUT: 950 mL / NET: 690 mL        Physical Exam:  (earlier today)  Daily     Daily   General:  comfortable-appearing in NAD, calm, not yawning  HEENT:  MMM +pharyngeal erythema but no exudates  Extremities:  +edema  Skin:  WWP, no visible goosebumps   Neuro:  AAOx3  rest of exam per PGY-1    LABS:                        8.9    6.87  )-----------( 380      ( 30 Mar 2023 08:50 )             28.6     03-30    139  |  104  |  18  ----------------------------<  80  4.6   |  28  |  1.16    Ca    8.7      30 Mar 2023 08:50  Phos  4.3     03-30  Mg     2.0     03-30    TPro  5.9<L>  /  Alb  2.5<L>  /  TBili  <0.2  /  DBili  x   /  AST  17  /  ALT  26  /  AlkPhos  71  03-30

## 2023-03-30 NOTE — PROGRESS NOTE ADULT - SUBJECTIVE AND OBJECTIVE BOX
O/N Events: NAN, COWS = 0     Subjective/ROS: Patient seen and examined at bedside. Endorses feeling better. No withdrawal sx.     Denies Fever/Chills, HA, CP, SOB, n/v, changes in bowel/urinary habits.  12pt ROS otherwise negative.    VITALS  Vital Signs Last 24 Hrs  T(C): 36.9 (30 Mar 2023 13:05), Max: 37 (29 Mar 2023 21:13)  T(F): 98.5 (30 Mar 2023 13:05), Max: 98.6 (29 Mar 2023 21:13)  HR: 75 (30 Mar 2023 13:05) (69 - 95)  BP: 147/85 (30 Mar 2023 13:05) (130/76 - 154/85)  BP(mean): --  RR: 18 (30 Mar 2023 13:05) (16 - 18)  SpO2: 100% (30 Mar 2023 13:05) (96% - 100%)    Parameters below as of 30 Mar 2023 13:05  Patient On (Oxygen Delivery Method): room air        CAPILLARY BLOOD GLUCOSE          PHYSICAL EXAM  GENERAL: Resting comfortably, NAD    HEAD:  NC/AT, MMM   EYES: Pupils wnl   NECK: Supple, No JVD  CHEST/LUNG: Clear; No w/c/r   HEART: S1, S2 wnl, RRR; No murmurs, rubs, or gallops  ABDOMEN: Bowel sounds x 4, normoactive, soft, NTND. No masses   EXTREMITIES:  2+ Peripheral Pulses, brisk capillary refill. No clubbing, cyanosis, or edema  NERVOUS SYSTEM:  Alert & Oriented X3, No focal deficits     MEDICATIONS  (STANDING):  amoxicillin  875 milliGRAM(s)/clavulanate 1 Tablet(s) Oral every 12 hours  enoxaparin Injectable 40 milliGRAM(s) SubCutaneous every 24 hours  furosemide   Injectable 20 milliGRAM(s) IV Push once  melatonin 10 milliGRAM(s) Oral at bedtime    MEDICATIONS  (PRN):  acetaminophen     Tablet .. 650 milliGRAM(s) Oral every 6 hours PRN Temp greater or equal to 38C (100.4F), Mild Pain (1 - 3)  cloNIDine 0.1 milliGRAM(s) Oral every 12 hours PRN Withdrawal symptoms  loperamide 2 milliGRAM(s) Oral every 4 hours PRN After each loose stool as needed for diarrhea  methadone    Tablet 5 milliGRAM(s) Oral every 4 hours PRN Severe Pain (7 - 10)  prochlorperazine   Tablet 10 milliGRAM(s) Oral every 8 hours PRN Nausea and/or Vomiting  QUEtiapine 50 milliGRAM(s) Oral every 6 hours PRN agitation/restless      No Known Allergies      LABS                        8.9    6.87  )-----------( 380      ( 30 Mar 2023 08:50 )             28.6     03-30    139  |  104  |  18  ----------------------------<  80  4.6   |  28  |  1.16    Ca    8.7      30 Mar 2023 08:50  Phos  4.3     03-30  Mg     2.0     03-30    TPro  5.9<L>  /  Alb  2.5<L>  /  TBili  <0.2  /  DBili  x   /  AST  17  /  ALT  26  /  AlkPhos  71  03-30                IMAGING/EKG/ETC  Reviewed

## 2023-03-30 NOTE — PROGRESS NOTE ADULT - ASSESSMENT
31 y/o M with Hx of polysubstance abuse (utox + for cocaine and opiates) presented to the ED with complaints of cough/shortness of breath fevers and LE edema found to be strep + w/ elevated antistreptolysin Ab nephrology consulted    Assessment/Plan:   #non-oliguric MILDRED   UA w/protein and blood   uPCR 1.5  unclear baseline creatinine  etiology of MILDRED includes:  post-strep GN given elevated antistreptolysin ab  pre-renal improving with IVF      Recommend:   please obtain  C-ANCA P-ANCA MPO Proteinase-3 PLA2R serum immunoelectrophoresis MARC DsDNA  if edema persists or worsens would give trial of lasix  if BP remains elevated even after patient no longer withdrawing would start an ace-i or arb  depending on serology results will possibly need a kidney bx  daily BMP  strict I/Os   renal diet     Thank you for the opportunity to participate in the care of your patient. The nephrology service remains available to assist with any questions or concerns. Please feel free to reach us by paging the on-call nephrology fellow for urgent issues or as below.   Imani Pritchard D.O  PGY-5, Nephrology Fellow   [phone] 31 y/o M with Hx of polysubstance abuse (utox + for cocaine and opiates) presented to the ED with complaints of cough/shortness of breath fevers and LE edema found to be strep + w/ elevated antistreptolysin Ab nephrology consulted.    Assessment/Plan:   #non-oliguric MILDRED   UA w/protein and blood   uPCR 1.5  unclear baseline creatinine  etiology of MILDRED includes:  post-strep GN given elevated antistreptolysin ab  pre-renal improving with IVF      Recommend:   -f/u  C-ANCA P-ANCA MPO Proteinase-3 PLA2R serum immunoelectrophoresis MARC DsDNA  if edema persists or worsens would give trial of lasix  if BP remains elevated even after patient no longer withdrawing would start an ace-i or arb  depending on serology results will possibly need a kidney bx  daily BMP  strict I/Os   renal diet     Thank you for the opportunity to participate in the care of your patient. The nephrology service remains available to assist with any questions or concerns.

## 2023-03-30 NOTE — PROGRESS NOTE ADULT - SUBJECTIVE AND OBJECTIVE BOX
OVERNIGHT EVENTS:    SUBJECTIVE / INTERVAL HPI: Patient seen and examined at bedside.     VITAL SIGNS:  Vital Signs Last 24 Hrs  T(C): 36.7 (30 Mar 2023 04:42), Max: 37 (29 Mar 2023 21:13)  T(F): 98 (30 Mar 2023 04:42), Max: 98.6 (29 Mar 2023 21:13)  HR: 69 (30 Mar 2023 04:42) (64 - 95)  BP: 139/96 (30 Mar 2023 04:42) (124/75 - 154/85)  BP(mean): --  RR: 17 (30 Mar 2023 04:42) (16 - 17)  SpO2: 99% (30 Mar 2023 04:42) (96% - 99%)    Parameters below as of 30 Mar 2023 04:42  Patient On (Oxygen Delivery Method): room air      I&O's Summary    29 Mar 2023 07:01  -  30 Mar 2023 07:00  --------------------------------------------------------  IN: 1780 mL / OUT: 300 mL / NET: 1480 mL        PHYSICAL EXAM:    General: WDWN  HEENT: NC/AT; PERRL, anicteric sclera; MMM  Neck: supple  Cardiovascular: +S1/S2; RRR  Respiratory: CTA B/L; no W/R/R  Gastrointestinal: soft, NT/ND; +BSx4  Extremities: WWP; no edema, clubbing or cyanosis  Vascular: 2+ radial, DP/PT pulses B/L  Neurological: AAOx3; no focal deficits    MEDICATIONS:  MEDICATIONS  (STANDING):  amoxicillin  875 milliGRAM(s)/clavulanate 1 Tablet(s) Oral every 12 hours  enoxaparin Injectable 40 milliGRAM(s) SubCutaneous every 24 hours  melatonin 10 milliGRAM(s) Oral at bedtime    MEDICATIONS  (PRN):  acetaminophen     Tablet .. 650 milliGRAM(s) Oral every 6 hours PRN Temp greater or equal to 38C (100.4F), Mild Pain (1 - 3)  cloNIDine 0.1 milliGRAM(s) Oral every 12 hours PRN Withdrawal symptoms  loperamide 2 milliGRAM(s) Oral every 4 hours PRN After each loose stool as needed for diarrhea  methadone    Tablet 5 milliGRAM(s) Oral every 4 hours PRN Severe Pain (7 - 10)  prochlorperazine   Tablet 10 milliGRAM(s) Oral every 8 hours PRN Nausea and/or Vomiting  QUEtiapine 50 milliGRAM(s) Oral every 6 hours PRN agitation/restless      ALLERGIES:  Allergies    No Known Allergies    Intolerances        LABS:                        8.9    6.87  )-----------( 380      ( 30 Mar 2023 08:50 )             28.6     03-29    141  |  108  |  13  ----------------------------<  79  4.6   |  26  |  1.26    Ca    8.8      29 Mar 2023 08:52  Phos  4.2     03-29  Mg     1.9     03-29    TPro  6.2  /  Alb  2.5<L>  /  TBili  <0.2  /  DBili  x   /  AST  21  /  ALT  32  /  AlkPhos  80  03-29        CAPILLARY BLOOD GLUCOSE          RADIOLOGY & ADDITIONAL TESTS: Reviewed.   OVERNIGHT EVENTS: NAN    SUBJECTIVE / INTERVAL HPI: Patient seen and examined at bedside. States his b/l LE swelling has improved slightly, denies fevers, difficulty urinating, blood in urine, cough.    VITAL SIGNS:  Vital Signs Last 24 Hrs  T(C): 36.7 (30 Mar 2023 04:42), Max: 37 (29 Mar 2023 21:13)  T(F): 98 (30 Mar 2023 04:42), Max: 98.6 (29 Mar 2023 21:13)  HR: 69 (30 Mar 2023 04:42) (64 - 95)  BP: 139/96 (30 Mar 2023 04:42) (124/75 - 154/85)  BP(mean): --  RR: 17 (30 Mar 2023 04:42) (16 - 17)  SpO2: 99% (30 Mar 2023 04:42) (96% - 99%)    Parameters below as of 30 Mar 2023 04:42  Patient On (Oxygen Delivery Method): room air      I&O's Summary    29 Mar 2023 07:01  -  30 Mar 2023 07:00  --------------------------------------------------------  IN: 1780 mL / OUT: 300 mL / NET: 1480 mL        PHYSICAL EXAM:    General: laying comfortably in bed  HEENT: PERRL, anicteric sclera; MMM  Cardiovascular: +S1/S2; RRR  Respiratory: CTA B/L; no W/R/R  Gastrointestinal: soft, NT/ND; +BSx4  Extremities: WWP; b/l LE 1+ pitting edema extending to shins  Vascular: 2+ radial, DP/PT pulses B/L  Neurological: AAOx3; no focal deficits    MEDICATIONS:  MEDICATIONS  (STANDING):  amoxicillin  875 milliGRAM(s)/clavulanate 1 Tablet(s) Oral every 12 hours  enoxaparin Injectable 40 milliGRAM(s) SubCutaneous every 24 hours  melatonin 10 milliGRAM(s) Oral at bedtime    MEDICATIONS  (PRN):  acetaminophen     Tablet .. 650 milliGRAM(s) Oral every 6 hours PRN Temp greater or equal to 38C (100.4F), Mild Pain (1 - 3)  cloNIDine 0.1 milliGRAM(s) Oral every 12 hours PRN Withdrawal symptoms  loperamide 2 milliGRAM(s) Oral every 4 hours PRN After each loose stool as needed for diarrhea  methadone    Tablet 5 milliGRAM(s) Oral every 4 hours PRN Severe Pain (7 - 10)  prochlorperazine   Tablet 10 milliGRAM(s) Oral every 8 hours PRN Nausea and/or Vomiting  QUEtiapine 50 milliGRAM(s) Oral every 6 hours PRN agitation/restless      ALLERGIES:  Allergies    No Known Allergies    Intolerances        LABS:                        8.9    6.87  )-----------( 380      ( 30 Mar 2023 08:50 )             28.6     03-29    141  |  108  |  13  ----------------------------<  79  4.6   |  26  |  1.26    Ca    8.8      29 Mar 2023 08:52  Phos  4.2     03-29  Mg     1.9     03-29    TPro  6.2  /  Alb  2.5<L>  /  TBili  <0.2  /  DBili  x   /  AST  21  /  ALT  32  /  AlkPhos  80  03-29        CAPILLARY BLOOD GLUCOSE          RADIOLOGY & ADDITIONAL TESTS: Reviewed.

## 2023-03-31 ENCOUNTER — TRANSCRIPTION ENCOUNTER (OUTPATIENT)
Age: 33
End: 2023-03-31

## 2023-03-31 PROBLEM — Z00.00 ENCOUNTER FOR PREVENTIVE HEALTH EXAMINATION: Noted: 2023-03-31

## 2023-03-31 LAB
ALBUMIN SERPL ELPH-MCNC: 3 G/DL — LOW (ref 3.3–5)
ALP SERPL-CCNC: 82 U/L — SIGNIFICANT CHANGE UP (ref 40–120)
ALT FLD-CCNC: 40 U/L — SIGNIFICANT CHANGE UP (ref 10–45)
ANA PAT FLD IF-IMP: ABNORMAL
ANA TITR SER: ABNORMAL
ANION GAP SERPL CALC-SCNC: 7 MMOL/L — SIGNIFICANT CHANGE UP (ref 5–17)
AST SERPL-CCNC: 41 U/L — HIGH (ref 10–40)
BASOPHILS # BLD AUTO: 0.11 K/UL — SIGNIFICANT CHANGE UP (ref 0–0.2)
BASOPHILS NFR BLD AUTO: 1.3 % — SIGNIFICANT CHANGE UP (ref 0–2)
BILIRUB SERPL-MCNC: <0.2 MG/DL — SIGNIFICANT CHANGE UP (ref 0.2–1.2)
BUN SERPL-MCNC: 25 MG/DL — HIGH (ref 7–23)
CALCIUM SERPL-MCNC: 9.3 MG/DL — SIGNIFICANT CHANGE UP (ref 8.4–10.5)
CHLORIDE SERPL-SCNC: 107 MMOL/L — SIGNIFICANT CHANGE UP (ref 96–108)
CO2 SERPL-SCNC: 30 MMOL/L — SIGNIFICANT CHANGE UP (ref 22–31)
CREAT SERPL-MCNC: 1.16 MG/DL — SIGNIFICANT CHANGE UP (ref 0.5–1.3)
CULTURE RESULTS: SIGNIFICANT CHANGE UP
CULTURE RESULTS: SIGNIFICANT CHANGE UP
EGFR: 85 ML/MIN/1.73M2 — SIGNIFICANT CHANGE UP
EOSINOPHIL # BLD AUTO: 0.37 K/UL — SIGNIFICANT CHANGE UP (ref 0–0.5)
EOSINOPHIL NFR BLD AUTO: 4.2 % — SIGNIFICANT CHANGE UP (ref 0–6)
GBM IGG SER-ACNC: <0.2 — SIGNIFICANT CHANGE UP (ref 0–0.9)
GLUCOSE SERPL-MCNC: 102 MG/DL — HIGH (ref 70–99)
HCT VFR BLD CALC: 32.6 % — LOW (ref 39–50)
HGB BLD-MCNC: 10 G/DL — LOW (ref 13–17)
IMM GRANULOCYTES NFR BLD AUTO: 0.5 % — SIGNIFICANT CHANGE UP (ref 0–0.9)
LYMPHOCYTES # BLD AUTO: 2.44 K/UL — SIGNIFICANT CHANGE UP (ref 1–3.3)
LYMPHOCYTES # BLD AUTO: 28 % — SIGNIFICANT CHANGE UP (ref 13–44)
MAGNESIUM SERPL-MCNC: 2 MG/DL — SIGNIFICANT CHANGE UP (ref 1.6–2.6)
MCHC RBC-ENTMCNC: 28.3 PG — SIGNIFICANT CHANGE UP (ref 27–34)
MCHC RBC-ENTMCNC: 30.7 GM/DL — LOW (ref 32–36)
MCV RBC AUTO: 92.4 FL — SIGNIFICANT CHANGE UP (ref 80–100)
MONOCYTES # BLD AUTO: 0.73 K/UL — SIGNIFICANT CHANGE UP (ref 0–0.9)
MONOCYTES NFR BLD AUTO: 8.4 % — SIGNIFICANT CHANGE UP (ref 2–14)
NEUTROPHILS # BLD AUTO: 5.03 K/UL — SIGNIFICANT CHANGE UP (ref 1.8–7.4)
NEUTROPHILS NFR BLD AUTO: 57.6 % — SIGNIFICANT CHANGE UP (ref 43–77)
NRBC # BLD: 0 /100 WBCS — SIGNIFICANT CHANGE UP (ref 0–0)
PHOSPHATE SERPL-MCNC: 4.2 MG/DL — SIGNIFICANT CHANGE UP (ref 2.5–4.5)
PHOSPHOLIPASE A2 RECEPTOR ELISA: <1.8 RU/ML — SIGNIFICANT CHANGE UP (ref 0–19.9)
PLATELET # BLD AUTO: 447 K/UL — HIGH (ref 150–400)
POTASSIUM SERPL-MCNC: 5.1 MMOL/L — SIGNIFICANT CHANGE UP (ref 3.5–5.3)
POTASSIUM SERPL-SCNC: 5.1 MMOL/L — SIGNIFICANT CHANGE UP (ref 3.5–5.3)
PROT SERPL-MCNC: 7 G/DL — SIGNIFICANT CHANGE UP (ref 6–8.3)
RBC # BLD: 3.53 M/UL — LOW (ref 4.2–5.8)
RBC # FLD: 13.9 % — SIGNIFICANT CHANGE UP (ref 10.3–14.5)
SODIUM SERPL-SCNC: 144 MMOL/L — SIGNIFICANT CHANGE UP (ref 135–145)
SPECIMEN SOURCE: SIGNIFICANT CHANGE UP
SPECIMEN SOURCE: SIGNIFICANT CHANGE UP
WBC # BLD: 8.72 K/UL — SIGNIFICANT CHANGE UP (ref 3.8–10.5)
WBC # FLD AUTO: 8.72 K/UL — SIGNIFICANT CHANGE UP (ref 3.8–10.5)

## 2023-03-31 PROCEDURE — 99232 SBSQ HOSP IP/OBS MODERATE 35: CPT

## 2023-03-31 PROCEDURE — 99233 SBSQ HOSP IP/OBS HIGH 50: CPT | Mod: GC

## 2023-03-31 RX ORDER — FUROSEMIDE 40 MG
1 TABLET ORAL
Qty: 7 | Refills: 0
Start: 2023-03-31 | End: 2023-04-06

## 2023-03-31 RX ADMIN — Medication 1 TABLET(S): at 05:06

## 2023-03-31 RX ADMIN — Medication 1 TABLET(S): at 18:44

## 2023-03-31 NOTE — DISCHARGE NOTE PROVIDER - DISCHARGE DIET
no rashes , no suspicious lesions , no areas of discoloration , no jaundice present , good turgor , no masses , no tenderness on palpation Regular Diet - No restrictions

## 2023-03-31 NOTE — PROGRESS NOTE ADULT - SUBJECTIVE AND OBJECTIVE BOX
O/N Events: NAN    Subjective/ROS: Patient seen and examined at bedside. Reports ankle and waist swelling.     Denies Fever/Chills, HA, CP, SOB, n/v, changes in bowel/urinary habits.  12pt ROS otherwise negative.    VITALS  Vital Signs Last 24 Hrs  T(C): 37.1 (31 Mar 2023 12:49), Max: 37.2 (30 Mar 2023 20:10)  T(F): 98.8 (31 Mar 2023 12:49), Max: 99 (30 Mar 2023 20:10)  HR: 78 (31 Mar 2023 12:49) (67 - 99)  BP: 143/87 (31 Mar 2023 12:49) (138/69 - 162/89)  BP(mean): --  RR: 18 (31 Mar 2023 12:49) (18 - 18)  SpO2: 97% (31 Mar 2023 12:49) (94% - 99%)    Parameters below as of 31 Mar 2023 12:49  Patient On (Oxygen Delivery Method): room air        CAPILLARY BLOOD GLUCOSE          PHYSICAL EXAM  General: NAD  Head: NC/AT; MMM  Neck: Supple; no JVD  Respiratory: CTAB; no wheezes/rales/rhonchi  Cardiovascular: RRR; S1/S2+, no murmurs, rubs gallops   Gastrointestinal: Soft; NTND; bowel sounds normal and present, edema around the waist   Extremities: WWP; 2+ edema around the ankles   Neurological: A&Ox3, no obvious focal deficits    MEDICATIONS  (STANDING):  amoxicillin  875 milliGRAM(s)/clavulanate 1 Tablet(s) Oral every 12 hours  enoxaparin Injectable 40 milliGRAM(s) SubCutaneous every 24 hours  melatonin 10 milliGRAM(s) Oral at bedtime    MEDICATIONS  (PRN):  acetaminophen     Tablet .. 650 milliGRAM(s) Oral every 6 hours PRN Temp greater or equal to 38C (100.4F), Mild Pain (1 - 3)  cloNIDine 0.1 milliGRAM(s) Oral every 12 hours PRN Withdrawal symptoms  loperamide 2 milliGRAM(s) Oral every 4 hours PRN After each loose stool as needed for diarrhea  methadone    Tablet 5 milliGRAM(s) Oral every 4 hours PRN Severe Pain (7 - 10)  prochlorperazine   Tablet 10 milliGRAM(s) Oral every 8 hours PRN Nausea and/or Vomiting  QUEtiapine 50 milliGRAM(s) Oral every 6 hours PRN agitation/restless      No Known Allergies      LABS                        10.0   8.72  )-----------( 447      ( 31 Mar 2023 06:41 )             32.6     03-31    144  |  107  |  25<H>  ----------------------------<  102<H>  5.1   |  30  |  1.16    Ca    9.3      31 Mar 2023 06:41  Phos  4.2     03-31  Mg     2.0     03-31    TPro  7.0  /  Alb  3.0<L>  /  TBili  <0.2  /  DBili  x   /  AST  41<H>  /  ALT  40  /  AlkPhos  82  03-31                IMAGING/EKG/ETC  Reviewed

## 2023-03-31 NOTE — PROGRESS NOTE ADULT - ASSESSMENT
33 y/o M with Hx of polysubstance abuse (utox + for cocaine and opiates) presented to the ED with complaints of cough/shortness of breath fevers and LE edema found to be strep + w/ elevated antistreptolysin Ab nephrology consulted.    Assessment/Plan:   #non-oliguric MILDRED   UA w/protein and blood   uPCR 1.5  unclear baseline creatinine  etiology of MILDRED includes:  post-strep GN given elevated antistreptolysin ab  pre-renal improving with IVF      Recommend:   - ANCA with borderline titre 1:80, C-ANCA P-ANCA, atypical ANCA negative  - pending MPO Proteinase-3 PLA2R serum immunoelectrophoresis, DsDNA  if edema persists or worsens would give trial of lasix  if BP remains elevated even after patient no longer withdrawing would start an ace-i or arb  depending on serology results will possibly need a kidney bx  daily BMP  strict I/Os   renal diet     Thank you for the opportunity to participate in the care of your patient. The nephrology service remains available to assist with any questions or concerns.

## 2023-03-31 NOTE — DISCHARGE NOTE PROVIDER - NSDCFUADDAPPT_GEN_ALL_CORE_FT
Please follow up for opioid use disorder treatment at:  Please follow up for opioid use disorder treatment at:     Telephonic SBIRT:  [phone]  mar [email or web address]

## 2023-03-31 NOTE — PROGRESS NOTE ADULT - SUBJECTIVE AND OBJECTIVE BOX
OVERNIGHT EVENTS: NAN    SUBJECTIVE / INTERVAL HPI: Patient seen and examined at bedside. no new complaints    VITAL SIGNS:  Vital Signs Last 24 Hrs  T(C): 36.8 (31 Mar 2023 08:57), Max: 37.2 (30 Mar 2023 20:10)  T(F): 98.3 (31 Mar 2023 08:57), Max: 99 (30 Mar 2023 20:10)  HR: 96 (31 Mar 2023 08:57) (67 - 99)  BP: 146/83 (31 Mar 2023 08:57) (138/69 - 162/89)  BP(mean): --  RR: 18 (31 Mar 2023 08:57) (18 - 18)  SpO2: 96% (31 Mar 2023 08:57) (94% - 100%)    Parameters below as of 31 Mar 2023 08:57  Patient On (Oxygen Delivery Method): room air      I&O's Summary    30 Mar 2023 07:01  -  31 Mar 2023 07:00  --------------------------------------------------------  IN: 1640 mL / OUT: 1550 mL / NET: 90 mL    31 Mar 2023 07:01  -  31 Mar 2023 11:37  --------------------------------------------------------  IN: 320 mL / OUT: 400 mL / NET: -80 mL        PHYSICAL EXAM:    General: WDWN  HEENT: PERRL, anicteric sclera; MMM  Cardiovascular: +S1/S2; RRR  Respiratory: CTA B/L; no W/R/R  Gastrointestinal: soft, NT/ND; +BSx4  Extremities: WWP; 1+ pitting edema b/l up to shins  Vascular: 2+ radial, DP/PT pulses B/L  Neurological: AAOx3; no focal deficits    MEDICATIONS:  MEDICATIONS  (STANDING):  amoxicillin  875 milliGRAM(s)/clavulanate 1 Tablet(s) Oral every 12 hours  enoxaparin Injectable 40 milliGRAM(s) SubCutaneous every 24 hours  melatonin 10 milliGRAM(s) Oral at bedtime    MEDICATIONS  (PRN):  acetaminophen     Tablet .. 650 milliGRAM(s) Oral every 6 hours PRN Temp greater or equal to 38C (100.4F), Mild Pain (1 - 3)  cloNIDine 0.1 milliGRAM(s) Oral every 12 hours PRN Withdrawal symptoms  loperamide 2 milliGRAM(s) Oral every 4 hours PRN After each loose stool as needed for diarrhea  methadone    Tablet 5 milliGRAM(s) Oral every 4 hours PRN Severe Pain (7 - 10)  prochlorperazine   Tablet 10 milliGRAM(s) Oral every 8 hours PRN Nausea and/or Vomiting  QUEtiapine 50 milliGRAM(s) Oral every 6 hours PRN agitation/restless      ALLERGIES:  Allergies    No Known Allergies    Intolerances        LABS:                        10.0   8.72  )-----------( 447      ( 31 Mar 2023 06:41 )             32.6     03-31    144  |  107  |  25<H>  ----------------------------<  102<H>  5.1   |  30  |  1.16    Ca    9.3      31 Mar 2023 06:41  Phos  4.2     03-31  Mg     2.0     03-31    TPro  7.0  /  Alb  3.0<L>  /  TBili  <0.2  /  DBili  x   /  AST  41<H>  /  ALT  40  /  AlkPhos  82  03-31        CAPILLARY BLOOD GLUCOSE          RADIOLOGY & ADDITIONAL TESTS: Reviewed.

## 2023-03-31 NOTE — DISCHARGE NOTE PROVIDER - NSDCFUSCHEDAPPT_GEN_ALL_CORE_FT
Obie Bradnon  [facility] Physician [facility]  NEPHRO [address]  Scheduled Appointment: 04/07/2023

## 2023-03-31 NOTE — DISCHARGE NOTE PROVIDER - NSDCCPCAREPLAN_GEN_ALL_CORE_FT
PRINCIPAL DISCHARGE DIAGNOSIS  Diagnosis: Multifocal pneumonia  Assessment and Plan of Treatment: In the most general of terms, pneumonia is an infection in the lungs caused by inflammation of the air sacs, known technically as alveoli. Those alveoli fill up with fluid or pus, which leads to symptomslike coughing, fever, and chills. But pneumonia can be much more complicated than that—it can be caused by different things (a virus, bacteria, or fungus), acquired in different environments (in the community or at the hospital), and can even differ in how much the infection affects the lungs. Multifocal pneumonia narrows the diagnosis down a little more to how much of the lung is affected. Essentially, multifocal pneumonia is a term that's used to describe pneumonia in different spots of the lung. We diagnosed you with chest imaging and treated you with intravenous antibiotics. We then transitioned your care to oral antibiotics. You will complete the course of oral antibiotics at home. You are medically cleared to leave the hospital.      SECONDARY DISCHARGE DIAGNOSES  Diagnosis: MILDRED (acute kidney injury)  Assessment and Plan of Treatment: Acute kidney injury (MILDRED) is where your kidneys suddenly stop working properly. It can range from minor loss of kidney function to complete kidney failure. MILDRED normally happens as a complication of another serious illness. It's not the result of a physical blow to the kidneys, as the name might suggest. 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 S. pyogenes are also called group A Streptococcus (group A strep). The renal doctors were consulted. We treated you with supportive care. Your kidney function has improved. We believe your swelling was related to this process. We gave you a diuretic to relieve some of the swelling. Please follow up with a nephrologist.    Diagnosis: Polysubstance abuse  Assessment and Plan of Treatment: Opiates or opioids are drugs used to treat pain. Opiates are derived from plants and opioids are synthetic drugs that have the same actions as opiates. The term narcotic refers to either type of drug. If you stop or cut back on these drugs after heavy use for a few weeks or more, you will have a number of symptoms. This is called withdrawal. We treated you with methadone and other drugs to improve your symptoms. We had the psychiatry team evaluate you. You are no longer in withdrawl and are cleared for discharge.

## 2023-03-31 NOTE — PROGRESS NOTE ADULT - ATTENDING COMMENTS
I:   Cr 1.16.   A: GN most c/w infection-related GN.   P: Advised that he must follow up with me within 1 month. Information given.
Consult note reviewed/confirmed. I:   MILDRED. Hematuria/proteinuria.   A: GN likely infection-related vs. other.   P: Follow SCr. Check serologies. Benefits of renal biopsy do not clearly outweigh risks at this time.
I:   Proteinuria/hematuria.   A: GN in setting of strep pharyngitis and PNA.   P: Follow SCr. Follow GN serologies. Will consider bx depending on results.
Pt. seen and examined by me earlier today; I have read Dr. Lopez's note, I agree w/ his findings and plan of care as documented; cont. abx, supportive care; f/u Renal and Psych recs; monitor for signs/sx of opioid withdrawal

## 2023-03-31 NOTE — DISCHARGE NOTE PROVIDER - NSDCMRMEDTOKEN_GEN_ALL_CORE_FT
amoxicillin-clavulanate 875 mg-125 mg oral tablet: 1 tab(s) orally every 12 hours   amoxicillin-clavulanate 875 mg-125 mg oral tablet: 1 tab(s) orally every 12 hours  furosemide 20 mg oral tablet: 1 tab(s) orally once a day

## 2023-03-31 NOTE — PROGRESS NOTE ADULT - SUBJECTIVE AND OBJECTIVE BOX
Patient is a 32y old  Male who presents with a chief complaint of PNA/Opioid Withdrawl (31 Mar 2023 14:16)      INTERVAL HPI/OVERNIGHT EVENTS:    Pt. seen and examined earlier today  Pt. c/o edema in B/L LE up to waist  Pt. c/o mild sore throat  OOB to chair, ambulating  Pt. denies F/C, CP, SOB    Review of Systems: 12 point review of systems otherwise negative    MEDICATIONS  (STANDING):  amoxicillin  875 milliGRAM(s)/clavulanate 1 Tablet(s) Oral every 12 hours  enoxaparin Injectable 40 milliGRAM(s) SubCutaneous every 24 hours  melatonin 10 milliGRAM(s) Oral at bedtime    MEDICATIONS  (PRN):  acetaminophen     Tablet .. 650 milliGRAM(s) Oral every 6 hours PRN Temp greater or equal to 38C (100.4F), Mild Pain (1 - 3)  cloNIDine 0.1 milliGRAM(s) Oral every 12 hours PRN Withdrawal symptoms  loperamide 2 milliGRAM(s) Oral every 4 hours PRN After each loose stool as needed for diarrhea  methadone    Tablet 5 milliGRAM(s) Oral every 4 hours PRN Severe Pain (7 - 10)  prochlorperazine   Tablet 10 milliGRAM(s) Oral every 8 hours PRN Nausea and/or Vomiting  QUEtiapine 50 milliGRAM(s) Oral every 6 hours PRN agitation/restless      Allergies    No Known Allergies    Intolerances          Vital Signs Last 24 Hrs  T(C): 37.1 (31 Mar 2023 16:35), Max: 37.1 (31 Mar 2023 12:49)  T(F): 98.7 (31 Mar 2023 16:35), Max: 98.8 (31 Mar 2023 12:49)  HR: 96 (31 Mar 2023 16:35) (67 - 96)  BP: 118/72 (31 Mar 2023 16:35) (118/72 - 162/89)  BP(mean): --  RR: 18 (31 Mar 2023 16:35) (18 - 18)  SpO2: 97% (31 Mar 2023 16:35) (96% - 97%)    Parameters below as of 31 Mar 2023 16:35  Patient On (Oxygen Delivery Method): room air      CAPILLARY BLOOD GLUCOSE          03-30 @ 07:01  -  03-31 @ 07:00  --------------------------------------------------------  IN: 1640 mL / OUT: 1550 mL / NET: 90 mL    03-31 @ 07:01  -  03-31 @ 20:44  --------------------------------------------------------  IN: 620 mL / OUT: 650 mL / NET: -30 mL        Physical Exam:  (earlier today)  Daily     Daily   General:  comfortable-appearing in NAD, calm, not yawning  HEENT:  MMM +pharyngeal erythema but no exudates  CV:  RRR  Lungs:  CTA B/L, normal WOB on RA  Extremities:  +pitting edema  Skin:  WWP, no visible goosebumps   Neuro:  AAOx3  rest of exam per PGY-1    LABS:                        10.0   8.72  )-----------( 447      ( 31 Mar 2023 06:41 )             32.6     03-31    144  |  107  |  25<H>  ----------------------------<  102<H>  5.1   |  30  |  1.16    Ca    9.3      31 Mar 2023 06:41  Phos  4.2     03-31  Mg     2.0     03-31    TPro  7.0  /  Alb  3.0<L>  /  TBili  <0.2  /  DBili  x   /  AST  41<H>  /  ALT  40  /  AlkPhos  82  03-31          Patient is a 32y old  Male who presents with a chief complaint of PNA/Opioid Withdrawl (31 Mar 2023 14:16)      INTERVAL HPI/OVERNIGHT EVENTS:    Review of Systems: 12 point review of systems otherwise negative    MEDICATIONS  (STANDING):  amoxicillin  875 milliGRAM(s)/clavulanate 1 Tablet(s) Oral every 12 hours  enoxaparin Injectable 40 milliGRAM(s) SubCutaneous every 24 hours  melatonin 10 milliGRAM(s) Oral at bedtime    MEDICATIONS  (PRN):  acetaminophen     Tablet .. 650 milliGRAM(s) Oral every 6 hours PRN Temp greater or equal to 38C (100.4F), Mild Pain (1 - 3)  cloNIDine 0.1 milliGRAM(s) Oral every 12 hours PRN Withdrawal symptoms  loperamide 2 milliGRAM(s) Oral every 4 hours PRN After each loose stool as needed for diarrhea  methadone    Tablet 5 milliGRAM(s) Oral every 4 hours PRN Severe Pain (7 - 10)  prochlorperazine   Tablet 10 milliGRAM(s) Oral every 8 hours PRN Nausea and/or Vomiting  QUEtiapine 50 milliGRAM(s) Oral every 6 hours PRN agitation/restless      Allergies    No Known Allergies    Intolerances          Vital Signs Last 24 Hrs  T(C): 37.1 (31 Mar 2023 16:35), Max: 37.1 (31 Mar 2023 12:49)  T(F): 98.7 (31 Mar 2023 16:35), Max: 98.8 (31 Mar 2023 12:49)  HR: 96 (31 Mar 2023 16:35) (67 - 96)  BP: 118/72 (31 Mar 2023 16:35) (118/72 - 162/89)  BP(mean): --  RR: 18 (31 Mar 2023 16:35) (18 - 18)  SpO2: 97% (31 Mar 2023 16:35) (96% - 97%)    Parameters below as of 31 Mar 2023 16:35  Patient On (Oxygen Delivery Method): room air      CAPILLARY BLOOD GLUCOSE          03-30 @ 07:01 - 03-31 @ 07:00  --------------------------------------------------------  IN: 1640 mL / OUT: 1550 mL / NET: 90 mL    03-31 @ 07:01  -  03-31 @ 20:46  --------------------------------------------------------  IN: 620 mL / OUT: 650 mL / NET: -30 mL        Physical Exam:    Daily     Daily   General:  Well appearing, NAD, not cachetic  HEENT:  Nonicteric, PERRLA  CV:  RRR, no murmur, no JVD  Lungs:  CTA B/L, no wheezes, rales, rhonchi  Abdomen:  Soft, non-tender, no distended, positive BS, no hepatosplenomegaly  Extremities:  2+ pulses, no c/c, no edema  Skin:  Warm and dry, no rashes  :  No ramey  Neuro:  AAOx3, non-focal, CN II-XII grossly intact  No Restraints    LABS:                        10.0   8.72  )-----------( 447      ( 31 Mar 2023 06:41 )             32.6     03-31    144  |  107  |  25<H>  ----------------------------<  102<H>  5.1   |  30  |  1.16    Ca    9.3      31 Mar 2023 06:41  Phos  4.2     03-31  Mg     2.0     03-31    TPro  7.0  /  Alb  3.0<L>  /  TBili  <0.2  /  DBili  x   /  AST  41<H>  /  ALT  40  /  AlkPhos  82  03-31

## 2023-03-31 NOTE — DISCHARGE NOTE PROVIDER - CARE PROVIDER_API CALL
Obie Brandon)  Internal Medicine; Nephrology  [address], [place] [address]  Phone: [phone]  Fax: [phone]  Scheduled Appointment: 04/07/2023 09:40 AM

## 2023-03-31 NOTE — DISCHARGE NOTE PROVIDER - HOSPITAL COURSE
33 y/o M with Hx of polysubstance abuse presented to ED with complaints of 1 weeks chest congestion, cough, SOB and subjective fevers and 2 days of LE edema found to have strep multifocal PNA with U/A suggestive of nephrotic process.     Problem/Plan - 1:  ·  Problem: Multifocal pneumonia.   ·  Plan: Presenting with 1 week of SOB, cough and subjective fevers, CT notable for multifocal PNA. Strep group A +. Not meeting SIRS criteria. S/P CTX and azithromycin in ED  Plan:  - The patient was treated with ceftriaxone 1g IV q24hrs and azithromycin 500mg IV q24hr yesterday   - The patient was then transitioned to oral Augmentin 825/125 q12 for 10 days   - The patient had blood cultures which showed NGTD   - Urine strep and legionella were ordered, MRSA swab was ordered and negative.      Problem/Plan - 2:  ·  Problem: MILDRED (acute kidney injury).   ·  Plan: Found to have multifocal PNA, strep group A positive and antistreptolysin O 1239. Cr 1.32 >> .98; U/A +protein, blood, WBC, RBC, hyaline casts, bacteria & epithelial cells. DDx includes  poststreptococcal glomerulonephritis as onset of edema abrupt and in relationship to strep PNA, however only hyaline casts in U/A so less likely vs pre-renal MILDRED iso volume down state. s/p NS 1L  Plan:  - Patient was treated with supportive care   - Renal consulted and recommendations were followed   - Cr was trended   - ANCA and associated AI lab studies were ordered   - Urine studies were evaluated  - F/U complement C3/C4 - C3 low consistent with PSGN  - SPEP/UPEP were ordered   - Blood pressure was monitored, considered ace or arb but nothing started as inpatient    - daily bmps to trend electrolytes   - strict I and Os were followed      Problem/Plan - 3:  ·  Problem: Edema due to hypoalbuminemia.   ·  Plan: Pt noted to have anasarca on admission, now improved on exam during admission. Likely 2/2 hypoalbuminemia in the setting of nephrotic process. Pt presenting with 1 week of coughing, SOB, subjective fevers, imaging consistent w/ multifocal PNA    Plan:  - IV lasix 20 x 1 for worsening edema in upper legs/hips most likely secondary to protein wasting   - F/U hepatitis panel & HIV serologies - which were negative   - Compression socks were ordered   - Elevate LEs  - Ambulate as tolerated.     Problem/Plan - 4:  ·  Problem: Anemia.   ·  Plan: Hgb 10.2>>8.9 >>8.9, denies hx of anemia, normocytic. No active signs of bleeding   Plan:  - Iron studies were followed   - Maintained active T&S  - Transfusion goal was over 7     Problem/Plan - 5:  ·  Problem: Polysubstance abuse.   ·  Plan: Withdrawal Resolved   Pt has hx of heroin use, UTox positive for cocaine and opiates. Pt refusing Suboxone and methadone, would just like withdrawal relief. COWS 8 during exam on arrival     Plan:  - Received methadone x 1   - Monitor COWS - max was 5-6 early in hospital course but then patient showed no withdrawal symptoms   - Psychiatry consulted - f/u recs regarding methadone and outpatient f/u - patient can follow up outpatient   - Cont clonidine 0.1mg BID PRN  - Cont loperamide 2mg PO q4hrs PRN for diarrhea  - Cont prochlorperazine 10mg PO q8hrs PRN for N/V  - Cont seroquel 50mg PO qhs PRN   - F/U HIV and Hep C serologies.    Meds started: CTX, Azithromycin, Augmentin, Lasix, PRNs  Meds held: n/a  Physical exam at d/c:   GENERAL: Resting comfortably, NAD    HEAD:  NC/AT, MMM   EYES: Pupils wnl   NECK: Supple, No JVD  CHEST/LUNG: Clear; No w/c/r   HEART: S1, S2 wnl, RRR; No murmurs, rubs, or gallops  ABDOMEN: Bowel sounds x 4, normoactive, soft, NTND. No masses   EXTREMITIES:  2+ Peripheral Pulses, brisk capillary refill. No clubbing, cyanosis, or edema  NERVOUS SYSTEM:  Alert & Oriented X3, No focal deficits

## 2023-03-31 NOTE — PROGRESS NOTE ADULT - PROBLEM SELECTOR PLAN 7
F: s/p NS 1L  E: replete K<4, Mg<2  N: Regular  D: Lovenox    Code: FULL  Dispo: [facility]

## 2023-04-01 ENCOUNTER — TRANSCRIPTION ENCOUNTER (OUTPATIENT)
Age: 33
End: 2023-04-01

## 2023-04-01 VITALS
RESPIRATION RATE: 16 BRPM | HEART RATE: 61 BPM | DIASTOLIC BLOOD PRESSURE: 91 MMHG | TEMPERATURE: 98 F | OXYGEN SATURATION: 97 % | SYSTOLIC BLOOD PRESSURE: 161 MMHG

## 2023-04-01 LAB
ALBUMIN SERPL ELPH-MCNC: 3.1 G/DL — LOW (ref 3.3–5)
ALP SERPL-CCNC: 113 U/L — SIGNIFICANT CHANGE UP (ref 40–120)
ALT FLD-CCNC: 94 U/L — HIGH (ref 10–45)
ANION GAP SERPL CALC-SCNC: 9 MMOL/L — SIGNIFICANT CHANGE UP (ref 5–17)
AST SERPL-CCNC: 116 U/L — HIGH (ref 10–40)
BASOPHILS # BLD AUTO: 0.09 K/UL — SIGNIFICANT CHANGE UP (ref 0–0.2)
BASOPHILS NFR BLD AUTO: 0.9 % — SIGNIFICANT CHANGE UP (ref 0–2)
BILIRUB SERPL-MCNC: <0.2 MG/DL — SIGNIFICANT CHANGE UP (ref 0.2–1.2)
BUN SERPL-MCNC: 18 MG/DL — SIGNIFICANT CHANGE UP (ref 7–23)
CALCIUM SERPL-MCNC: 9 MG/DL — SIGNIFICANT CHANGE UP (ref 8.4–10.5)
CHLORIDE SERPL-SCNC: 102 MMOL/L — SIGNIFICANT CHANGE UP (ref 96–108)
CO2 SERPL-SCNC: 28 MMOL/L — SIGNIFICANT CHANGE UP (ref 22–31)
CREAT SERPL-MCNC: 0.95 MG/DL — SIGNIFICANT CHANGE UP (ref 0.5–1.3)
EGFR: 108 ML/MIN/1.73M2 — SIGNIFICANT CHANGE UP
EOSINOPHIL # BLD AUTO: 0.35 K/UL — SIGNIFICANT CHANGE UP (ref 0–0.5)
EOSINOPHIL NFR BLD AUTO: 3.6 % — SIGNIFICANT CHANGE UP (ref 0–6)
GLUCOSE SERPL-MCNC: 87 MG/DL — SIGNIFICANT CHANGE UP (ref 70–99)
HCT VFR BLD CALC: 32.3 % — LOW (ref 39–50)
HGB BLD-MCNC: 10 G/DL — LOW (ref 13–17)
IMM GRANULOCYTES NFR BLD AUTO: 0.3 % — SIGNIFICANT CHANGE UP (ref 0–0.9)
LYMPHOCYTES # BLD AUTO: 2.63 K/UL — SIGNIFICANT CHANGE UP (ref 1–3.3)
LYMPHOCYTES # BLD AUTO: 27.3 % — SIGNIFICANT CHANGE UP (ref 13–44)
MAGNESIUM SERPL-MCNC: 1.9 MG/DL — SIGNIFICANT CHANGE UP (ref 1.6–2.6)
MCHC RBC-ENTMCNC: 28 PG — SIGNIFICANT CHANGE UP (ref 27–34)
MCHC RBC-ENTMCNC: 31 GM/DL — LOW (ref 32–36)
MCV RBC AUTO: 90.5 FL — SIGNIFICANT CHANGE UP (ref 80–100)
MONOCYTES # BLD AUTO: 0.67 K/UL — SIGNIFICANT CHANGE UP (ref 0–0.9)
MONOCYTES NFR BLD AUTO: 7 % — SIGNIFICANT CHANGE UP (ref 2–14)
NEUTROPHILS # BLD AUTO: 5.87 K/UL — SIGNIFICANT CHANGE UP (ref 1.8–7.4)
NEUTROPHILS NFR BLD AUTO: 60.9 % — SIGNIFICANT CHANGE UP (ref 43–77)
NRBC # BLD: 0 /100 WBCS — SIGNIFICANT CHANGE UP (ref 0–0)
PHOSPHATE SERPL-MCNC: 3.1 MG/DL — SIGNIFICANT CHANGE UP (ref 2.5–4.5)
PLATELET # BLD AUTO: 390 K/UL — SIGNIFICANT CHANGE UP (ref 150–400)
POTASSIUM SERPL-MCNC: 5.1 MMOL/L — SIGNIFICANT CHANGE UP (ref 3.5–5.3)
POTASSIUM SERPL-SCNC: 5.1 MMOL/L — SIGNIFICANT CHANGE UP (ref 3.5–5.3)
PROT SERPL-MCNC: 7.3 G/DL — SIGNIFICANT CHANGE UP (ref 6–8.3)
RBC # BLD: 3.57 M/UL — LOW (ref 4.2–5.8)
RBC # FLD: 14 % — SIGNIFICANT CHANGE UP (ref 10.3–14.5)
SODIUM SERPL-SCNC: 139 MMOL/L — SIGNIFICANT CHANGE UP (ref 135–145)
WBC # BLD: 9.64 K/UL — SIGNIFICANT CHANGE UP (ref 3.8–10.5)
WBC # FLD AUTO: 9.64 K/UL — SIGNIFICANT CHANGE UP (ref 3.8–10.5)

## 2023-04-01 PROCEDURE — 84156 ASSAY OF PROTEIN URINE: CPT

## 2023-04-01 PROCEDURE — 82570 ASSAY OF URINE CREATININE: CPT

## 2023-04-01 PROCEDURE — 87389 HIV-1 AG W/HIV-1&-2 AB AG IA: CPT

## 2023-04-01 PROCEDURE — 84300 ASSAY OF URINE SODIUM: CPT

## 2023-04-01 PROCEDURE — 84436 ASSAY OF TOTAL THYROXINE: CPT

## 2023-04-01 PROCEDURE — 99285 EMERGENCY DEPT VISIT HI MDM: CPT

## 2023-04-01 PROCEDURE — 87899 AGENT NOS ASSAY W/OPTIC: CPT

## 2023-04-01 PROCEDURE — 83690 ASSAY OF LIPASE: CPT

## 2023-04-01 PROCEDURE — 83516 IMMUNOASSAY NONANTIBODY: CPT

## 2023-04-01 PROCEDURE — 86036 ANCA SCREEN EACH ANTIBODY: CPT

## 2023-04-01 PROCEDURE — 83605 ASSAY OF LACTIC ACID: CPT

## 2023-04-01 PROCEDURE — 85027 COMPLETE CBC AUTOMATED: CPT

## 2023-04-01 PROCEDURE — 82728 ASSAY OF FERRITIN: CPT

## 2023-04-01 PROCEDURE — 85025 COMPLETE CBC W/AUTO DIFF WBC: CPT

## 2023-04-01 PROCEDURE — 86038 ANTINUCLEAR ANTIBODIES: CPT

## 2023-04-01 PROCEDURE — 82550 ASSAY OF CK (CPK): CPT

## 2023-04-01 PROCEDURE — 80307 DRUG TEST PRSMV CHEM ANLYZR: CPT

## 2023-04-01 PROCEDURE — 71275 CT ANGIOGRAPHY CHEST: CPT

## 2023-04-01 PROCEDURE — 87040 BLOOD CULTURE FOR BACTERIA: CPT

## 2023-04-01 PROCEDURE — 74174 CTA ABD&PLVS W/CONTRAST: CPT

## 2023-04-01 PROCEDURE — 87880 STREP A ASSAY W/OPTIC: CPT

## 2023-04-01 PROCEDURE — 86162 COMPLEMENT TOTAL (CH50): CPT

## 2023-04-01 PROCEDURE — 80053 COMPREHEN METABOLIC PANEL: CPT

## 2023-04-01 PROCEDURE — 36415 COLL VENOUS BLD VENIPUNCTURE: CPT

## 2023-04-01 PROCEDURE — 84100 ASSAY OF PHOSPHORUS: CPT

## 2023-04-01 PROCEDURE — 86060 ANTISTREPTOLYSIN O TITER: CPT

## 2023-04-01 PROCEDURE — 76705 ECHO EXAM OF ABDOMEN: CPT

## 2023-04-01 PROCEDURE — 86803 HEPATITIS C AB TEST: CPT

## 2023-04-01 PROCEDURE — 85045 AUTOMATED RETICULOCYTE COUNT: CPT

## 2023-04-01 PROCEDURE — 87340 HEPATITIS B SURFACE AG IA: CPT

## 2023-04-01 PROCEDURE — 0225U NFCT DS DNA&RNA 21 SARSCOV2: CPT

## 2023-04-01 PROCEDURE — 85652 RBC SED RATE AUTOMATED: CPT

## 2023-04-01 PROCEDURE — 84165 PROTEIN E-PHORESIS SERUM: CPT

## 2023-04-01 PROCEDURE — 81001 URINALYSIS AUTO W/SCOPE: CPT

## 2023-04-01 PROCEDURE — 87637 SARSCOV2&INF A&B&RSV AMP PRB: CPT

## 2023-04-01 PROCEDURE — 85379 FIBRIN DEGRADATION QUANT: CPT

## 2023-04-01 PROCEDURE — 86140 C-REACTIVE PROTEIN: CPT

## 2023-04-01 PROCEDURE — 84155 ASSAY OF PROTEIN SERUM: CPT

## 2023-04-01 PROCEDURE — 85730 THROMBOPLASTIN TIME PARTIAL: CPT

## 2023-04-01 PROCEDURE — 87449 NOS EACH ORGANISM AG IA: CPT

## 2023-04-01 PROCEDURE — 86706 HEP B SURFACE ANTIBODY: CPT

## 2023-04-01 PROCEDURE — 83880 ASSAY OF NATRIURETIC PEPTIDE: CPT

## 2023-04-01 PROCEDURE — 86703 HIV-1/HIV-2 1 RESULT ANTBDY: CPT

## 2023-04-01 PROCEDURE — 83735 ASSAY OF MAGNESIUM: CPT

## 2023-04-01 PROCEDURE — 86705 HEP B CORE ANTIBODY IGM: CPT

## 2023-04-01 PROCEDURE — 86334 IMMUNOFIX E-PHORESIS SERUM: CPT

## 2023-04-01 PROCEDURE — 84443 ASSAY THYROID STIM HORMONE: CPT

## 2023-04-01 PROCEDURE — 83550 IRON BINDING TEST: CPT

## 2023-04-01 PROCEDURE — 86255 FLUORESCENT ANTIBODY SCREEN: CPT

## 2023-04-01 PROCEDURE — 85610 PROTHROMBIN TIME: CPT

## 2023-04-01 PROCEDURE — 86160 COMPLEMENT ANTIGEN: CPT

## 2023-04-01 PROCEDURE — 84484 ASSAY OF TROPONIN QUANT: CPT

## 2023-04-01 PROCEDURE — 99239 HOSP IP/OBS DSCHRG MGMT >30: CPT | Mod: GC

## 2023-04-01 PROCEDURE — 83540 ASSAY OF IRON: CPT

## 2023-04-01 PROCEDURE — 93970 EXTREMITY STUDY: CPT

## 2023-04-01 PROCEDURE — 86709 HEPATITIS A IGM ANTIBODY: CPT

## 2023-04-01 PROCEDURE — 71046 X-RAY EXAM CHEST 2 VIEWS: CPT

## 2023-04-01 PROCEDURE — 86704 HEP B CORE ANTIBODY TOTAL: CPT

## 2023-04-01 PROCEDURE — 82553 CREATINE MB FRACTION: CPT

## 2023-04-01 RX ADMIN — Medication 1 TABLET(S): at 05:23

## 2023-04-01 NOTE — PROGRESS NOTE ADULT - NSPROGADDITIONALINFOA_GEN_ALL_CORE
DVT ppx: LMWH  Dispo: home pending Renal recs
DVT ppx: LMWH  Dispo: home pending Renal recs
DVT ppx: LMWH  Dispo: d/c home today w/ outpatient Renal f/u
DVT ppx: LMWH  Dispo: d/c home w/ outpatient Renal f/u

## 2023-04-01 NOTE — PROGRESS NOTE ADULT - PROBLEM SELECTOR PROBLEM 1
Strep pharyngitis
Multifocal pneumonia
Strep pharyngitis
Strep pharyngitis
Multifocal pneumonia
Strep pharyngitis
Multifocal pneumonia
Multifocal pneumonia

## 2023-04-01 NOTE — DISCHARGE NOTE NURSING/CASE MANAGEMENT/SOCIAL WORK - NSDCPEFALRISK_GEN_ALL_CORE
For information on Fall & Injury Prevention, visit: [email or web address] OR  [email or web address] OR  https://www.cdc.gov/steadi/patient.html

## 2023-04-01 NOTE — PROGRESS NOTE ADULT - PROBLEM SELECTOR PROBLEM 4
Anemia
MILDRED (acute kidney injury)
Anemia
MILDRED (acute kidney injury)
Anemia
Anemia

## 2023-04-01 NOTE — PROGRESS NOTE ADULT - PROBLEM SELECTOR PLAN 4
Hgb 10 today, denies hx of anemia, normocytic. No active signs of bleeding    Plan:  - F/U iron studies  - F/U TSH & T4  - Maintain active T&S  - Transfusion goal 7
likely prerenal component; encourage PO intake; monitor BMP; additional GN work-up, as above, per Renal recs
Hgb 10.2>>8.9 >>8.9, denies hx of anemia, normocytic. No active signs of bleeding    Plan:  - F/U iron studies  - F/U TSH & T4  - Maintain active T&S  - Transfusion goal 7
likely prerenal component; encourage PO intake; monitor BMP; additional GN work-up, as above, per Renal recs
likely prerenal component; encourage PO intake; monitor BMP; additional GN work-up, as above, per Renal recs
Hgb 10.2>>8.9, denies hx of anemia, normocytic. No active signs of bleeding    Plan:  - F/U iron studies  - F/U TSH & T4  - Maintain active T&S  - Transfusion goal 7
Hgb 10.2>>8.9 >>8.6, denies hx of anemia, normocytic. No active signs of bleeding    Plan:  - F/U iron studies  - F/U TSH & T4  - Maintain active T&S  - Transfusion goal 7
likely prerenal component; encourage PO intake; monitor BMP; additional GN work-up, as above, per Renal recs

## 2023-04-01 NOTE — PROGRESS NOTE ADULT - PROBLEM SELECTOR PLAN 2
CT chest reviewed, shows "multifocal bilateral pneumonia;" clinically improved; cont. Augmentin, as above; f/u cultures
CT chest reviewed, shows "multifocal bilateral pneumonia;" clinically improved; cont. Augmentin, as above; f/u cultures
Found to have multifocal PNA, strep group A positive and antistreptolysin O 1239. Cr 1.32 >> .98; U/A +protein, blood, WBC, RBC, hyaline casts, bacteria & epithelial cells. DDx includes  poststreptococcal glomerulonephritis as onset of edema abrupt and in relationship to strep PNA, however only hyaline casts in U/A so less likely vs pre-renal MILDRED iso volume down state. s/p NS 1L    Plan:  - Renal consulted - f/u recs  - Cr wnl - 1.16   - F/U ANCA    - F/U urine studies  - F/U complement C3/C4 - C3 low  - F/U SPEP/UPEP  - F/U Blood pressure, consider ace or arb   - daily bmps  - strict I and O
Found to have multifocal PNA, strep group A positive and antistreptolysin O 1239. Cr 1.32 >> .98; U/A +protein, blood, WBC, RBC, hyaline casts, bacteria & epithelial cells. DDx includes  poststreptococcal glomerulonephritis as onset of edema abrupt and in relationship to strep PNA, however only hyaline casts in U/A so less likely vs pre-renal MILDRED iso volume down state. s/p NS 1L    Plan:  - Renal consulted  - F/U ANCA    - F/U urine studies  - F/U complement C3/C4 - C3 low  - F/U SPEP/UPEP  - F/U Blood pressure, consider ace or arb   - daily bmps  - strict I and O
CT chest reviewed, shows "multifocal bilateral pneumonia;" clinically improved; cont. Augmentin, as above; f/u cultures
Found to have multifocal PNA, strep group A positive and antistreptolysin O 1239. Cr 1.32 >> .98; U/A +protein, blood, WBC, RBC, hyaline casts, bacteria & epithelial cells. DDx includes  poststreptococcal glomerulonephritis as onset of edema abrupt and in relationship to strep PNA, however only hyaline casts in U/A so less likely vs pre-renal MILDRED iso volume down state. s/p NS 1L    Plan:  - Renal consulted  - F/U ANCA    - F/U urine studies  - F/U complement C3/C4  - F/U SPEP/UPEP
CT chest reviewed, shows "multifocal bilateral pneumonia;" clinically improved; cont. Augmentin, as above; f/u cultures
Found to have multifocal PNA, strep group A positive and antistreptolysin O 1239. Cr 1.32 >> .98; U/A +protein, blood, WBC, RBC, hyaline casts, bacteria & epithelial cells. DDx includes  poststreptococcal glomerulonephritis as onset of edema abrupt and in relationship to strep PNA, however only hyaline casts in U/A so less likely vs pre-renal MILDRED iso volume down state. s/p NS 1L    Plan:  - Renal consulted - f/u recs  - Cr downtrending   - F/U ANCA    - F/U urine studies  - F/U complement C3/C4 - C3 low  - F/U SPEP/UPEP  - F/U Blood pressure, consider ace or arb   - daily bmps  - strict I and O

## 2023-04-01 NOTE — PROGRESS NOTE ADULT - PROBLEM SELECTOR PROBLEM 6
Thrombocytosis
Thrombocytosis
Polysubstance abuse
Polysubstance abuse
Thrombocytosis
Thrombocytosis
Polysubstance abuse
Polysubstance abuse

## 2023-04-01 NOTE — PROGRESS NOTE ADULT - PROBLEM SELECTOR PROBLEM 3
Edema due to hypoalbuminemia
Edema due to hypoalbuminemia
Proteinuria
Edema due to hypoalbuminemia
Edema due to hypoalbuminemia

## 2023-04-01 NOTE — PROGRESS NOTE ADULT - PROBLEM SELECTOR PLAN 1
rapid Group A Strep test + 3/26; cont. Augmentin, will complete 10-day course
Presenting with 1 week of SOB, cough and subjective fevers, CT notable for multifocal PNA. Strep group A +. Not meeting SIRS criteria. S/P CTX and azithromycin in ED    Plan:  - D/Theo ceftriaxone 1g IV q24hrs and azithromycin 500mg IV q24hr yesterday   - C/w augmentin 825/125 q12 for 10 days   - F/U BCx - NGTD   - F/U strep, legionella urine antigen  - F/U MRSA swab
rapid Group A Strep test + 3/26; cont. Augmentin, will complete 10-day course
rapid Group A Strep test + 3/26; cont. Augmentin, will complete 10-day course
Presenting with 1 week of SOB, cough and subjective fevers, CT notable for multifocal PNA. Strep group A +. Not meeting SIRS criteria. S/P CTX and azithromycin in ED    Plan:  - D/C ceftriaxone 1g IV q24hrs and azithromycin 500mg IV q24hr  - Start on augmentin 825/125 q12 for 10 days   - F/U BCx  - F/U strep, legionella urine antigen  - F/U MRSA swab
Presenting with 1 week of SOB, cough and subjective fevers, CT notable for multifocal PNA. Strep group A +. Not meeting SIRS criteria. S/P CTX and azithromycin in ED    Plan:  - Cont ceftriaxone 1g IV q24hrs and azithromycin 500mg IV q24hr  - F/U BCx  - F/U strep, legionella urine antigen  - F/U MRSA swab
rapid Group A Strep test + 3/26; cont. Augmentin, will complete 10-day course
Presenting with 1 week of SOB, cough and subjective fevers, CT notable for multifocal PNA. Strep group A +. Not meeting SIRS criteria. S/P CTX and azithromycin in ED    Plan:  - D/Theo ceftriaxone 1g IV q24hrs and azithromycin 500mg IV q24hr yesterday   - C/w augmentin 825/125 q12 for 10 days   - F/U BCx - NGTD   - F/U strep, legionella urine antigen  - F/U MRSA swab

## 2023-04-01 NOTE — DISCHARGE NOTE NURSING/CASE MANAGEMENT/SOCIAL WORK - NSDCFUADDAPPT_GEN_ALL_CORE_FT
Please follow up for opioid use disorder treatment at:     Telephonic SBIRT:  [phone]  [email or web address]

## 2023-04-01 NOTE — PROGRESS NOTE ADULT - PROBLEM SELECTOR PLAN 3
Pt noted to have anasarca on admission, now improved on exam during admission. Likely 2/2 hypoalbuminemia in the setting of nephrotic process. Pt presenting with 1 week of coughing, SOB, subjective fevers, imaging consistent w/ multifocal PNA    Plan:  - IV lasix 20 x 1 for worsening edema in upper legs/hips   - Lasix 20mg PO for discharge   - f/u renal recs   - F/U SPEP/UPEP  - F/U hepatitis panel & HIV serologies   - Compression socks  - Elevate LEs  - Ambulate as tolerated
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 work-up and mgmt per Renal recs; monitor for elevated BP and edema
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 work-up and mgmt per Renal, plan to d/c home on PO Lasix w/ close outpatient f/u
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 work-up and mgmt per Renal, plan to d/c home on PO Lasix w/ close outpatient f/u
IMPROVING   Pt noted to have anasarca on admission, now improved on exam during admission. Likely 2/2 hypoalbuminemia in the setting of nephrotic process. Pt presenting with 1 week of coughing, SOB, subjective fevers, imaging consistent w/ multifocal PNA    Plan:  - F/U SPEP/UPEP  - F/U hepatitis panel & HIV  - Compression socks  - Elevate LEs  - Ambulate as tolerated
Pt noted to have anasarca on admission, now improved on exam during admission. Likely 2/2 hypoalbuminemia in the setting of nephrotic process. Pt presenting with 1 week of coughing, SOB, subjective fevers, imaging consistent w/ multifocal PNA    Plan:  - F/U SPEP/UPEP  - F/U hepatitis panel & HIV  - Compression socks  - Elevate LEs  - Ambulate as tolerated
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 work-up and mgmt per Renal, will give Lasix 20mg IV x1 per recs for worsening edema
Pt noted to have anasarca on admission, now improved on exam during admission. Likely 2/2 hypoalbuminemia in the setting of nephrotic process. Pt presenting with 1 week of coughing, SOB, subjective fevers, imaging consistent w/ multifocal PNA    Plan:  - IV lasix 20 x 1 for worsening edema in upper legs/hips   - f/u renal recs   - F/U SPEP/UPEP  - F/U hepatitis panel & HIV serologies   - Compression socks  - Elevate LEs  - Ambulate as tolerated

## 2023-04-01 NOTE — PROGRESS NOTE ADULT - PROBLEM SELECTOR PLAN 5
Plt 566>>419, likely reactive    447 today     Plan:  - Monitor CBC
Utox +opiate +cocaine; Pt. reports heroin use; Psych following, provided support and psychoeducation; no e/o withdrawal today; cont. serial COWS, supportive care; HIV and HCV negative
Utox +opiate +cocaine; Pt. reports heroin use; Psych following, provided support and psychoeducation; no e/o withdrawal today; cont. serial COWS, supportive care; HIV and HCV negative
Plt 566>>419, likely reactive    380 today     Plan:  - Monitor CBC
Utox +opiate +cocaine; Pt. reports heroin use; Psych following, provided support and psychoeducation; no e/o withdrawal today; cont. serial COWS, supportive care; HIV and HCV negative
Utox +opiate +cocaine; Pt. reports heroin use; Psych following, provided support and psychoeducation; no e/o withdrawal today; cont. serial COWS, supportive care; HIV and HCV negative
Plt 566>>419, likely reactive    Plan:  - Monitor CBC
Plt 566>>419, likely reactive    360 today     Plan:  - Monitor CBC

## 2023-04-01 NOTE — PROGRESS NOTE ADULT - SUBJECTIVE AND OBJECTIVE BOX
Patient is a 32y old  Male who presents with a chief complaint of PNA/Opioid Withdrawl (31 Mar 2023 14:16)      INTERVAL HPI/OVERNIGHT EVENTS:    Pt. seen and examined earlier today  Pt. reports less LE edema  Pt. denies F/C, CP, SOB    Review of Systems: 12 point review of systems otherwise negative    MEDICATIONS  (STANDING):  amoxicillin  875 milliGRAM(s)/clavulanate 1 Tablet(s) Oral every 12 hours  enoxaparin Injectable 40 milliGRAM(s) SubCutaneous every 24 hours  melatonin 10 milliGRAM(s) Oral at bedtime    MEDICATIONS  (PRN):  acetaminophen     Tablet .. 650 milliGRAM(s) Oral every 6 hours PRN Temp greater or equal to 38C (100.4F), Mild Pain (1 - 3)  cloNIDine 0.1 milliGRAM(s) Oral every 12 hours PRN Withdrawal symptoms  loperamide 2 milliGRAM(s) Oral every 4 hours PRN After each loose stool as needed for diarrhea  methadone    Tablet 5 milliGRAM(s) Oral every 4 hours PRN Severe Pain (7 - 10)  prochlorperazine   Tablet 10 milliGRAM(s) Oral every 8 hours PRN Nausea and/or Vomiting  QUEtiapine 50 milliGRAM(s) Oral every 6 hours PRN agitation/restless      Allergies    No Known Allergies    Intolerances          Vital Signs Last 24 Hrs  T(C): 36.6 (01 Apr 2023 09:18), Max: 37.1 (31 Mar 2023 16:35)  T(F): 97.9 (01 Apr 2023 09:18), Max: 98.7 (31 Mar 2023 16:35)  HR: 61 (01 Apr 2023 09:18) (61 - 96)  BP: 161/91 (01 Apr 2023 09:18) (118/72 - 161/91)  BP(mean): --  RR: 16 (01 Apr 2023 09:18) (16 - 18)  SpO2: 97% (01 Apr 2023 09:18) (97% - 98%)    Parameters below as of 01 Apr 2023 09:18  Patient On (Oxygen Delivery Method): room air      CAPILLARY BLOOD GLUCOSE          03-31 @ 07:01  -  04-01 @ 07:00  --------------------------------------------------------  IN: 620 mL / OUT: 650 mL / NET: -30 mL        Physical Exam:  (earlier today)  Daily     Daily   General:  comfortable-appearing in NAD, calm, not yawning  HEENT:  MMM  Extremities:  less B/L LE pitting edema  Skin:  WWP, no visible goosebumps   Neuro:  AAOx3  rest of exam per PGY-1    LABS:                        10.0   9.64  )-----------( 390      ( 01 Apr 2023 06:32 )             32.3     04-01    139  |  102  |  18  ----------------------------<  87  5.1   |  28  |  0.95    Ca    9.0      01 Apr 2023 06:32  Phos  3.1     04-01  Mg     1.9     04-01    TPro  7.3  /  Alb  3.1<L>  /  TBili  <0.2  /  DBili  x   /  AST  116<H>  /  ALT  94<H>  /  AlkPhos  113  04-01

## 2023-04-01 NOTE — PROGRESS NOTE ADULT - TIME BILLING
coordination of care  d/w Medicine residents
coordination of care  d/c planning  d/w Medicine residents
coordination of care  d/w Medicine residents
coordination of care  d/w Medicine residents

## 2023-04-01 NOTE — PROGRESS NOTE ADULT - PROBLEM SELECTOR PROBLEM 2
Multifocal pneumonia
Multifocal pneumonia
MILDRED (acute kidney injury)
Multifocal pneumonia
Multifocal pneumonia
MILDRED (acute kidney injury)

## 2023-04-01 NOTE — PROGRESS NOTE ADULT - PROBLEM SELECTOR PLAN 6
likely reactive, due to infection; # normalized; monitor CBC
Withdrawl Resolved   Pt has hx of heroin use, UTox positive for cocaine and opiates. Pt refusing Suboxone and methadone, would just like withdrawal relief. COWS 8 during exam on arrival     Plan:  - received methadone x 1   - Monitor COWS - 0 overnight   - Psychiatry consulted - f/u recs regarding methadone and outpatient f/u   - Cont clonidine 0.1mg BID PRN  - Cont loperamide 2mg PO q4hrs PRN for diarrhea  - Cont prochlorperazine 10mg PO q8hrs PRN for N/V  - Cont seroquel 50mg PO qhs PRN   - F/U HIV and Hep C serologies  - f/u outpatient clinic
Pt has hx of heroin use, UTox positive for cocaine and opiates. Pt refusing Suboxone and methadone, would just like withdrawal relief. COWS 8 during exam on arrival     Plan:  - received methadone x 1   - Monitor COWS - 0 overnight   - Psychiatry consulted - f/u recs regarding methadone   - Cont clonidine 0.1mg BID PRN  - Cont loperamide 2mg PO q4hrs PRN for diarrhea  - Cont prochlorperazine 10mg PO q8hrs PRN for N/V  - Cont seroquel 50mg PO qhs PRN   - F/U HIV and Hep C
likely reactive, due to infection; # normalized; monitor CBC
Pt has hx of heroin use, UTox positive for cocaine and opiates. Pt refusing Suboxone and methadone, would just like withdrawal relief. COWS 8 during exam on arrival     Plan:  - received methadone x 1   - Monitor COWS  - Psychiatry consulted - f/u recs regarding methadone   - Cont clonidine 0.1mg BID PRN  - Cont loperamide 2mg PO q4hrs PRN for diarrhea  - Cont prochlorperazine 10mg PO q8hrs PRN for N/V  - Cont seroquel 50mg PO qhs PRN   - F/U HIV and Hep C
Withdrawl Resolved   Pt has hx of heroin use, UTox positive for cocaine and opiates. Pt refusing Suboxone and methadone, would just like withdrawal relief. COWS 8 during exam on arrival     Plan:  - received methadone x 1   - Monitor COWS - 0 overnight   - Psychiatry consulted - f/u recs regarding methadone and outpatient f/u   - Cont clonidine 0.1mg BID PRN  - Cont loperamide 2mg PO q4hrs PRN for diarrhea  - Cont prochlorperazine 10mg PO q8hrs PRN for N/V  - Cont seroquel 50mg PO qhs PRN   - F/U HIV and Hep C serologies

## 2023-04-01 NOTE — PROGRESS NOTE ADULT - PROVIDER SPECIALTY LIST ADULT
Nephrology
Internal Medicine
Internal Medicine
Hospitalist
Internal Medicine
Hospitalist
Internal Medicine

## 2023-04-01 NOTE — DISCHARGE NOTE NURSING/CASE MANAGEMENT/SOCIAL WORK - PATIENT PORTAL LINK FT
You can access the FollowMyHealth Patient Portal offered by [facility] by registering at the following website: [email or web address]. By joining [employer]’s FollowMyHealth portal, you will also be able to view your health information using other applications (apps) compatible with our system.

## 2023-04-02 ENCOUNTER — EMERGENCY (EMERGENCY)
Facility: HOSPITAL | Age: 33
LOS: 1 days | Discharge: HOME | End: 2023-04-02
Attending: EMERGENCY MEDICINE | Admitting: EMERGENCY MEDICINE
Payer: MEDICAID

## 2023-04-02 VITALS
RESPIRATION RATE: 18 BRPM | HEIGHT: 67 IN | HEART RATE: 61 BPM | OXYGEN SATURATION: 95 % | TEMPERATURE: 97 F | WEIGHT: 164.91 LBS | SYSTOLIC BLOOD PRESSURE: 151 MMHG | DIASTOLIC BLOOD PRESSURE: 96 MMHG

## 2023-04-02 VITALS
TEMPERATURE: 98 F | RESPIRATION RATE: 15 BRPM | OXYGEN SATURATION: 96 % | SYSTOLIC BLOOD PRESSURE: 136 MMHG | DIASTOLIC BLOOD PRESSURE: 78 MMHG

## 2023-04-02 DIAGNOSIS — K60.2 ANAL FISSURE, UNSPECIFIED: Chronic | ICD-10-CM

## 2023-04-02 DIAGNOSIS — G43.909 MIGRAINE, UNSPECIFIED, NOT INTRACTABLE, WITHOUT STATUS MIGRAINOSUS: ICD-10-CM

## 2023-04-02 DIAGNOSIS — Z20.822 CONTACT WITH AND (SUSPECTED) EXPOSURE TO COVID-19: ICD-10-CM

## 2023-04-02 DIAGNOSIS — R51.9 HEADACHE, UNSPECIFIED: ICD-10-CM

## 2023-04-02 LAB
ALBUMIN SERPL ELPH-MCNC: 2.7 G/DL — LOW (ref 3.4–5)
ALP SERPL-CCNC: 95 U/L — SIGNIFICANT CHANGE UP (ref 40–120)
ALT FLD-CCNC: 90 U/L — HIGH (ref 12–42)
ANION GAP SERPL CALC-SCNC: 5 MMOL/L — LOW (ref 9–16)
APTT BLD: 29 SEC — SIGNIFICANT CHANGE UP (ref 27.5–35.5)
AST SERPL-CCNC: 50 U/L — HIGH (ref 15–37)
BASOPHILS # BLD AUTO: 0.07 K/UL — SIGNIFICANT CHANGE UP (ref 0–0.2)
BASOPHILS NFR BLD AUTO: 0.8 % — SIGNIFICANT CHANGE UP (ref 0–2)
BILIRUB SERPL-MCNC: 0.4 MG/DL — SIGNIFICANT CHANGE UP (ref 0.2–1.2)
BUN SERPL-MCNC: 16 MG/DL — SIGNIFICANT CHANGE UP (ref 7–23)
CALCIUM SERPL-MCNC: 9 MG/DL — SIGNIFICANT CHANGE UP (ref 8.5–10.5)
CHLORIDE SERPL-SCNC: 105 MMOL/L — SIGNIFICANT CHANGE UP (ref 96–108)
CO2 SERPL-SCNC: 31 MMOL/L — SIGNIFICANT CHANGE UP (ref 22–31)
CREAT SERPL-MCNC: 1.02 MG/DL — SIGNIFICANT CHANGE UP (ref 0.5–1.3)
EGFR: 100 ML/MIN/1.73M2 — SIGNIFICANT CHANGE UP
EOSINOPHIL # BLD AUTO: 0.38 K/UL — SIGNIFICANT CHANGE UP (ref 0–0.5)
EOSINOPHIL NFR BLD AUTO: 4.2 % — SIGNIFICANT CHANGE UP (ref 0–6)
GLUCOSE BLDC GLUCOMTR-MCNC: 103 MG/DL — HIGH (ref 70–99)
GLUCOSE BLDC GLUCOMTR-MCNC: 98 MG/DL — SIGNIFICANT CHANGE UP (ref 70–99)
GLUCOSE SERPL-MCNC: 103 MG/DL — HIGH (ref 70–99)
HCT VFR BLD CALC: 29.9 % — LOW (ref 39–50)
HGB BLD-MCNC: 9.2 G/DL — LOW (ref 13–17)
IMM GRANULOCYTES NFR BLD AUTO: 0.4 % — SIGNIFICANT CHANGE UP (ref 0–0.9)
INR BLD: 1.06 — SIGNIFICANT CHANGE UP (ref 0.88–1.16)
LYMPHOCYTES # BLD AUTO: 1.77 K/UL — SIGNIFICANT CHANGE UP (ref 1–3.3)
LYMPHOCYTES # BLD AUTO: 19.6 % — SIGNIFICANT CHANGE UP (ref 13–44)
MCHC RBC-ENTMCNC: 28.4 PG — SIGNIFICANT CHANGE UP (ref 27–34)
MCHC RBC-ENTMCNC: 30.8 GM/DL — LOW (ref 32–36)
MCV RBC AUTO: 92.3 FL — SIGNIFICANT CHANGE UP (ref 80–100)
MONOCYTES # BLD AUTO: 0.63 K/UL — SIGNIFICANT CHANGE UP (ref 0–0.9)
MONOCYTES NFR BLD AUTO: 7 % — SIGNIFICANT CHANGE UP (ref 2–14)
NEUTROPHILS # BLD AUTO: 6.12 K/UL — SIGNIFICANT CHANGE UP (ref 1.8–7.4)
NEUTROPHILS NFR BLD AUTO: 68 % — SIGNIFICANT CHANGE UP (ref 43–77)
NRBC # BLD: 0 /100 WBCS — SIGNIFICANT CHANGE UP (ref 0–0)
PLATELET # BLD AUTO: 276 K/UL — SIGNIFICANT CHANGE UP (ref 150–400)
POTASSIUM SERPL-MCNC: 3.9 MMOL/L — SIGNIFICANT CHANGE UP (ref 3.5–5.3)
POTASSIUM SERPL-SCNC: 3.9 MMOL/L — SIGNIFICANT CHANGE UP (ref 3.5–5.3)
PROT SERPL-MCNC: 7.6 G/DL — SIGNIFICANT CHANGE UP (ref 6.4–8.2)
PROTHROM AB SERPL-ACNC: 12.4 SEC — SIGNIFICANT CHANGE UP (ref 10.5–13.4)
RBC # BLD: 3.24 M/UL — LOW (ref 4.2–5.8)
RBC # FLD: 14.4 % — SIGNIFICANT CHANGE UP (ref 10.3–14.5)
SARS-COV-2 RNA SPEC QL NAA+PROBE: SIGNIFICANT CHANGE UP
SODIUM SERPL-SCNC: 141 MMOL/L — SIGNIFICANT CHANGE UP (ref 132–145)
WBC # BLD: 9.01 K/UL — SIGNIFICANT CHANGE UP (ref 3.8–10.5)
WBC # FLD AUTO: 9.01 K/UL — SIGNIFICANT CHANGE UP (ref 3.8–10.5)

## 2023-04-02 PROCEDURE — 70450 CT HEAD/BRAIN W/O DYE: CPT | Mod: 26,59

## 2023-04-02 PROCEDURE — 99285 EMERGENCY DEPT VISIT HI MDM: CPT

## 2023-04-02 PROCEDURE — 70498 CT ANGIOGRAPHY NECK: CPT | Mod: 26

## 2023-04-02 PROCEDURE — 70496 CT ANGIOGRAPHY HEAD: CPT | Mod: 26

## 2023-04-02 PROCEDURE — 0042T: CPT

## 2023-04-02 RX ORDER — METOCLOPRAMIDE HCL 10 MG
5 TABLET ORAL ONCE
Refills: 0 | Status: COMPLETED | OUTPATIENT
Start: 2023-04-02 | End: 2023-04-02

## 2023-04-02 RX ORDER — KETOROLAC TROMETHAMINE 30 MG/ML
30 SYRINGE (ML) INJECTION ONCE
Refills: 0 | Status: DISCONTINUED | OUTPATIENT
Start: 2023-04-02 | End: 2023-04-02

## 2023-04-02 RX ADMIN — Medication 5 MILLIGRAM(S): at 16:06

## 2023-04-02 RX ADMIN — Medication 5 MILLIGRAM(S): at 14:16

## 2023-04-02 RX ADMIN — Medication 30 MILLIGRAM(S): at 14:17

## 2023-04-02 NOTE — ED PROVIDER NOTE - PHYSICAL EXAMINATION
Constitutional:  Well appearing, awake, alert, oriented to person, place, time/situation and in no apparent distress  Head:  NC/AT, symmetric, neck supple  ENMT: Airway patent, no tongue deviation  Eyes:  Clear bilaterally, pupils equal, round and reactive to light  Cardiac:  Normal rate, regular rhythm. Heart sounds S1,S2.  No murmurs, rubs or gallops.  No JVD.  Respiratory:  Breath sounds clear and equal bilaterally  MSK:  Spine appears normal, range of motion is not limited, no muscle or joint tenderness  Neuro:  Alert and oriented, no dysarthria, strength 5/5 throughout, no facial asymmetry, CN II-XII intact   Skin:  Skin normal color for race, warm, dry and intact.  No evidence of rash  Psych:  Normal mood and affect, no apparent risk to self or others.

## 2023-04-02 NOTE — ED PROVIDER NOTE - CLINICAL SUMMARY MEDICAL DECISION MAKING FREE TEXT BOX
33 yo male with PMH of migraines, opioid use on methadone, and recent admission for multifocal PNA (discharged yesterday) who is presenting c/o acute onset headache that started this morning after waking up around 830 AM.     Acute onset headache   - stroke activated to rule out ICH - CTH negative   - stroke code deescalated, preliminary CTA and CT perfusion negative   - NIHSS of 0   - toradol, reglan, fluids    - will reassess 33 yo male with PMH of migraines, opioid use on methadone, and recent admission for multifocal PNA (discharged yesterday) who is presenting c/o acute onset headache that started this morning after waking up around 830 AM.     Acute onset headache   - stroke activated to rule out ICH - CTH negative   - stroke code deescalated, preliminary CTA and CT perfusion negative   - likely migraine headache  - NIHSS of 0   - toradol, reglan, fluids    - will reassess

## 2023-04-02 NOTE — ED PROVIDER NOTE - PATIENT PORTAL LINK FT
You can access the FollowMyHealth Patient Portal offered by [facility] by registering at the following website: [email or web address]. By joining [employer] [facility]’s FollowMyHealth portal, you will also be able to view your health information using other applications (apps) compatible with our system.

## 2023-04-02 NOTE — ED ADULT NURSE NOTE - OBJECTIVE STATEMENT
c/o migraine and vomiting x 1 day, no s/s of distress, awaiting imaging results, will continue to monitor

## 2023-04-02 NOTE — ED ADULT TRIAGE NOTE - CHIEF COMPLAINT QUOTE
Pt BIBEMS from home with multiple medical complaints. PT with migraine and vomiting at this time. Pt discharged from [facility] yesterday for strep throat, pneumonia, MILDRED, and opoid withdraw. As per EMS upon their arrival pt was diaphoretic and warm to touch. Pt afebrile in triage. bgl in field 105 .

## 2023-04-02 NOTE — ED PROVIDER NOTE - NS ED ROS FT
Constitutional:  no fever, no chills  Eyes:  no discharge, no irritations, no pain, no redness, visual changes  Ears:  no ear pain, no ear drainage,  no hearing problems  Nose:  no nasal congestion, no nasal drainage  Mouth/Throat:  no hoarseness and no throat pain  Neck:  no stiffness, no pain, no lumps, no swollen glands  Cardiac:  no chest pain, no edema  Respiratory:  no cough, no shortness of breath  GI: +nausea and vomiting, no abdominal pain, no bloating, no constipation, no diarrhea  MSK:  no back pain, no msk pain, no weakness  NEURO:  + headache, no weakness, no numbness  Skin:  no lesions, no pruritis, no jaundice, no bruising, no rash  Psych:  no known mental health issues

## 2023-04-02 NOTE — ED PROVIDER NOTE - NSFOLLOWUPINSTRUCTIONS_ED_ALL_ED_FT
Migraine Headache  A migraine headache is an intense, throbbing pain on one side or both sides of the head. Migraine headaches may also cause other symptoms, such as nausea, vomiting, and sensitivity to light and noise. A migraine headache can last from 4 hours to 3 days. Talk with your doctor about what things may bring on (trigger) your migraine headaches.    What are the causes?  The exact cause of this condition is not known. However, a migraine may be caused when nerves in the brain become irritated and release chemicals that cause inflammation of blood vessels. This inflammation causes pain. This condition may be triggered or caused by:  Drinking alcohol.  Smoking.  Taking medicines, such as:  Medicine used to treat chest pain (nitroglycerin).  Birth control pills.  Estrogen.  Certain blood pressure medicines.  Eating or drinking products that contain nitrates, glutamate, aspartame, or tyramine. Aged cheeses, chocolate, or caffeine may also be triggers.  Doing physical activity.  Other things that may trigger a migraine headache include:  Menstruation.  Pregnancy.  Hunger.  Stress.  Lack of sleep or too much sleep.  Weather changes.  Fatigue.  What increases the risk?  The following factors may make you more likely to experience migraine headaches:  Being a certain age. This condition is more common in people who are 25–55 years old.  Being female.  Having a family history of migraine headaches.  Being .  Having a mental health condition, such as depression or anxiety.  Being obese.  What are the signs or symptoms?  The main symptom of this condition is pulsating or throbbing pain. This pain may:  Happen in any area of the head, such as on one side or both sides.  Interfere with daily activities.  Get worse with physical activity.  Get worse with exposure to bright lights or loud noises.  Other symptoms may include:  Nausea.  Vomiting.  Dizziness.  General sensitivity to bright lights, loud noises, or smells.  Before you get a migraine headache, you may get warning signs (an aura). An aura may include:  Seeing flashing lights or having blind spots.  Seeing bright spots, halos, or zigzag lines.  Having tunnel vision or blurred vision.  Having numbness or a tingling feeling.  Having trouble talking.  Having muscle weakness.  Some people have symptoms after a migraine headache (postdromal phase), such as:  Feeling tired.  Difficulty concentrating.  How is this diagnosed?  A migraine headache can be diagnosed based on:  Your symptoms.  A physical exam.  Tests, such as:  CT scan or an MRI of the head. These imaging tests can help rule out other causes of headaches.  Taking fluid from the spine (lumbar puncture) and analyzing it (cerebrospinal fluid analysis, or CSF analysis).  How is this treated?  This condition may be treated with medicines that:  Relieve pain.  Relieve nausea.  Prevent migraine headaches.  Treatment for this condition may also include:  Acupuncture.  Lifestyle changes like avoiding foods that trigger migraine headaches.  Biofeedback.  Cognitive behavioral therapy.  Follow these instructions at home:  Medicines    Take over-the-counter and prescription medicines only as told by your health care provider.  Ask your health care provider if the medicine prescribed to you:  Requires you to avoid driving or using heavy machinery.  Can cause constipation. You may need to take these actions to prevent or treat constipation:  Drink enough fluid to keep your urine pale yellow.  Take over-the-counter or prescription medicines.  Eat foods that are high in fiber, such as beans, whole grains, and fresh fruits and vegetables.  Limit foods that are high in fat and processed sugars, such as fried or sweet foods.  Lifestyle    Do not drink alcohol.  Do not use any products that contain nicotine or tobacco, such as cigarettes, e-cigarettes, and chewing tobacco. If you need help quitting, ask your health care provider.  Get at least 8 hours of sleep every night.  Find ways to manage stress, such as meditation, deep breathing, or yoga.  General instructions        Keep a journal to find out what may trigger your migraine headaches. For example, write down:  What you eat and drink.  How much sleep you get.  Any change to your diet or medicines.  If you have a migraine headache:  Avoid things that make your symptoms worse, such as bright lights.  It may help to lie down in a dark, quiet room.  Do not drive or use heavy machinery.  Ask your health care provider what activities are safe for you while you are experiencing symptoms.  Keep all follow-up visits as told by your health care provider. This is important.  Contact a health care provider if:  You develop symptoms that are different or more severe than your usual migraine headache symptoms.  You have more than 15 headache days in one month.  Get help right away if:  Your migraine headache becomes severe.  Your migraine headache lasts longer than 72 hours.  You have a fever.  You have a stiff neck.  You have vision loss.  Your muscles feel weak or like you cannot control them.  You start to lose your balance often.  You have trouble walking.  You faint.  You have a seizure.  Summary  A migraine headache is an intense, throbbing pain on one side or both sides of the head. Migraines may also cause other symptoms, such as nausea, vomiting, and sensitivity to light and noise.  This condition may be treated with medicines and lifestyle changes. You may also need to avoid certain things that trigger a migraine headache.  Keep a journal to find out what may trigger your migraine headaches.  Contact your health care provider if you have more than 15 headache days in a month or you develop symptoms that are different or more severe than your usual migraine headache symptoms.

## 2023-04-02 NOTE — ED PROVIDER NOTE - NSFOLLOWUPCLINICS_GEN_ALL_ED_FT
[facility] General Internal Medicine  General Internal Medicine  [address] [place] [address]  Phone: [phone]  Fax:

## 2023-04-02 NOTE — ED PROVIDER NOTE - OBJECTIVE STATEMENT
Pt is a 33 yo male with PMH of migraines, opioid use on methadone, and recent admission for multifocal PNA (discharged yesterday) who is presenting c/o acute onset headache that started this morning after waking up around 830 AM. Pt states that after waking up the headache started and reached maximal onset in a couple of seconds. He has never gone to the hospital for headache in the past. He reports associated nausea and vomiting prior to arrival in the ED. No recent head trauma. He is taking augmentin for the PNA. He denies fever, neck pain, vision loss, numbness, weakness, or slurred speech.

## 2023-04-02 NOTE — ED ADULT NURSE NOTE - CHIEF COMPLAINT QUOTE
Pt BIBEMS from home with multiple medical complaints. PT with migraine and vomiting at this time. Pt discharged from [facility] yesterday for strep throat, pneumonia, MILDRED, and opoid withdraw. As per EMS upon their arrival pt was diaphoretic and warm to touch. Pt afebrile in triage. bgl in field 105 .

## 2023-04-04 LAB — DSDNA AB SER QL CLIF: NEGATIVE — SIGNIFICANT CHANGE UP

## 2023-04-05 LAB
% ALBUMIN: 38 % — SIGNIFICANT CHANGE UP
% ALPHA 1: 5.2 % — SIGNIFICANT CHANGE UP
% ALPHA 2: 11.8 % — SIGNIFICANT CHANGE UP
% BETA: 11.3 % — SIGNIFICANT CHANGE UP
% GAMMA: 33.7 % — SIGNIFICANT CHANGE UP
ALBUMIN SERPL ELPH-MCNC: 2.5 G/DL — LOW (ref 3.6–5.5)
ALBUMIN/GLOB SERPL ELPH: 0.6 RATIO — SIGNIFICANT CHANGE UP
ALPHA1 GLOB SERPL ELPH-MCNC: 0.3 G/DL — SIGNIFICANT CHANGE UP (ref 0.1–0.4)
ALPHA2 GLOB SERPL ELPH-MCNC: 0.8 G/DL — SIGNIFICANT CHANGE UP (ref 0.5–1)
B-GLOBULIN SERPL ELPH-MCNC: 0.8 G/DL — SIGNIFICANT CHANGE UP (ref 0.5–1)
GAMMA GLOBULIN: 2.3 G/DL — HIGH (ref 0.6–1.6)
INTERPRETATION SERPL IFE-IMP: SIGNIFICANT CHANGE UP
PROT PATTERN SERPL ELPH-IMP: SIGNIFICANT CHANGE UP

## 2023-04-06 ENCOUNTER — NON-APPOINTMENT (OUTPATIENT)
Age: 33
End: 2023-04-06

## 2023-04-07 ENCOUNTER — APPOINTMENT (OUTPATIENT)
Dept: NEPHROLOGY | Facility: CLINIC | Age: 33
End: 2023-04-07

## 2023-04-13 DIAGNOSIS — J18.9 PNEUMONIA, UNSPECIFIED ORGANISM: ICD-10-CM

## 2023-04-13 DIAGNOSIS — F11.13 OPIOID ABUSE WITH WITHDRAWAL: ICD-10-CM

## 2023-04-13 DIAGNOSIS — F19.10 OTHER PSYCHOACTIVE SUBSTANCE ABUSE, UNCOMPLICATED: ICD-10-CM

## 2023-04-13 DIAGNOSIS — E87.1 HYPO-OSMOLALITY AND HYPONATREMIA: ICD-10-CM

## 2023-04-13 DIAGNOSIS — E88.09 OTHER DISORDERS OF PLASMA-PROTEIN METABOLISM, NOT ELSEWHERE CLASSIFIED: ICD-10-CM

## 2023-04-13 DIAGNOSIS — R80.9 PROTEINURIA, UNSPECIFIED: ICD-10-CM

## 2023-04-13 DIAGNOSIS — D64.9 ANEMIA, UNSPECIFIED: ICD-10-CM

## 2023-04-13 DIAGNOSIS — N17.9 ACUTE KIDNEY FAILURE, UNSPECIFIED: ICD-10-CM

## 2023-04-13 DIAGNOSIS — D75.839 THROMBOCYTOSIS, UNSPECIFIED: ICD-10-CM

## 2023-04-13 DIAGNOSIS — R60.1 GENERALIZED EDEMA: ICD-10-CM

## 2023-04-13 DIAGNOSIS — J02.0 STREPTOCOCCAL PHARYNGITIS: ICD-10-CM

## 2023-04-13 DIAGNOSIS — R31.9 HEMATURIA, UNSPECIFIED: ICD-10-CM

## 2023-04-13 DIAGNOSIS — N00.9 ACUTE NEPHRITIC SYNDROME WITH UNSPECIFIED MORPHOLOGIC CHANGES: ICD-10-CM

## 2023-04-24 ENCOUNTER — EMERGENCY (EMERGENCY)
Facility: HOSPITAL | Age: 33
LOS: 1 days | Discharge: HOME | End: 2023-04-24
Attending: EMERGENCY MEDICINE | Admitting: EMERGENCY MEDICINE
Payer: MEDICAID

## 2023-04-24 VITALS
DIASTOLIC BLOOD PRESSURE: 98 MMHG | RESPIRATION RATE: 18 BRPM | HEIGHT: 67 IN | OXYGEN SATURATION: 97 % | WEIGHT: 160.06 LBS | HEART RATE: 91 BPM | TEMPERATURE: 97 F | SYSTOLIC BLOOD PRESSURE: 147 MMHG

## 2023-04-24 DIAGNOSIS — K60.2 ANAL FISSURE, UNSPECIFIED: Chronic | ICD-10-CM

## 2023-04-24 PROCEDURE — 99284 EMERGENCY DEPT VISIT MOD MDM: CPT

## 2023-04-24 RX ORDER — ONDANSETRON 8 MG/1
4 TABLET, FILM COATED ORAL ONCE
Refills: 0 | Status: COMPLETED | OUTPATIENT
Start: 2023-04-24 | End: 2023-04-24

## 2023-04-24 RX ORDER — DIPHENHYDRAMINE HCL 50 MG
25 CAPSULE ORAL ONCE
Refills: 0 | Status: COMPLETED | OUTPATIENT
Start: 2023-04-24 | End: 2023-04-24

## 2023-04-24 RX ORDER — DIAZEPAM 5 MG
5 TABLET ORAL ONCE
Refills: 0 | Status: DISCONTINUED | OUTPATIENT
Start: 2023-04-24 | End: 2023-04-24

## 2023-04-24 RX ORDER — METHADONE HYDROCHLORIDE 40 MG/1
10 TABLET ORAL ONCE
Refills: 0 | Status: DISCONTINUED | OUTPATIENT
Start: 2023-04-24 | End: 2023-04-24

## 2023-04-24 RX ORDER — LOPERAMIDE HCL 2 MG
2 TABLET ORAL ONCE
Refills: 0 | Status: COMPLETED | OUTPATIENT
Start: 2023-04-24 | End: 2023-04-24

## 2023-04-24 RX ADMIN — METHADONE HYDROCHLORIDE 10 MILLIGRAM(S): 40 TABLET ORAL at 18:02

## 2023-04-24 RX ADMIN — Medication 5 MILLIGRAM(S): at 18:51

## 2023-04-24 RX ADMIN — Medication 25 MILLIGRAM(S): at 18:02

## 2023-04-24 RX ADMIN — ONDANSETRON 4 MILLIGRAM(S): 8 TABLET, FILM COATED ORAL at 18:02

## 2023-04-24 RX ADMIN — Medication 2 MILLIGRAM(S): at 18:02

## 2023-04-24 NOTE — ED ADULT NURSE NOTE - OBJECTIVE STATEMENT
Pt is a/ox4 c/o heroin withdraw Pt is a/ox4 c/o heroin withdrawal, last heroin was yesterday even, in [employer] custody, States normally have 2-3 bags of heroin. Denies SON, chest pain.  Denies pain. States he is feeling nausea, runny nose, watery eyes. Denies fever. Wanting methadone. Denies chills. No fever. Will continue w/ plan of care.

## 2023-04-24 NOTE — ED PROVIDER NOTE - OBJECTIVE STATEMENT
31 yo M pmh of opiate abuse (on methadone)   Presents in [facility] custody c/o opiate w/d  Denies other acute complaints

## 2023-04-24 NOTE — ED PROVIDER NOTE - PATIENT PORTAL LINK FT
You can access the FollowMyHealth Patient Portal offered by [facility] by registering at the following website: [email or web address]. By joining [facility]’s FollowMyHealth portal, you will also be able to view your health information using other applications (apps) compatible with our system.

## 2023-04-24 NOTE — ED ADULT TRIAGE NOTE - CHIEF COMPLAINT QUOTE
BIBA under police custody for heroin withdrawals, c/o runny eyes, runny nose. last used heroin yesterday. requesting methadone. appears comfortable

## 2023-04-26 DIAGNOSIS — F11.10 OPIOID ABUSE, UNCOMPLICATED: ICD-10-CM

## 2023-04-26 DIAGNOSIS — F11.13 OPIOID ABUSE WITH WITHDRAWAL: ICD-10-CM

## 2023-05-06 PROBLEM — Z78.9 OTHER SPECIFIED HEALTH STATUS: Chronic | Status: ACTIVE | Noted: 2023-04-24

## 2023-05-31 ENCOUNTER — NON-APPOINTMENT (OUTPATIENT)
Age: 33
End: 2023-05-31

## 2023-11-15 NOTE — BH CONSULTATION LIAISON PROGRESS NOTE - NSBHASSESSMENTFT_PSY_ALL_CORE
32 man with no PPH, long history of polysubstance abuse (opiates, cocaine, alcohol- social) who presented to ED with complaints of 1 weeks chest congestion, cough, SOB and subjective fevers and 2 days of LE edema found to have strep multifocal PNA with U/A suggestive of nephrotic process. Psychiatry was consulted to provide recommendations for opioid withdrawal. On assessment pt presents without any objective sx of opioid withdrawal, c/o back pain.  32 man with no PPH, long history of polysubstance abuse (opiates, cocaine, alcohol- social) who presented to ED with complaints of 1 weeks chest congestion, cough, SOB and subjective fevers and 2 days of LE edema found to have strep multifocal PNA with U/A suggestive of nephrotic process. Psychiatry was consulted to provide recommendations for opioid withdrawal. On assessment pt presents without any objective sx of opioid withdrawal, c/o back pain.       Note updated this morning as it did not save the plan yesterday.  Plan:  -psychoeducation provided  -consider daily methadone 15mg po for methadone maintenance   -for breakthrough withdrawal sx (COWS>6) use methadone 5mg po q4h, max of 2doses per 24hr (daily max of 25mg methadone)  -continue symptomatic treatment with clonidine 0.1mg bid, loperamide, compazine, melatonin  -prns for agitation with seroquel 50mg po q6h prn  -pt can follow at [facility]  -cl to follow as needed please call with quetions   Opioid Counseling: I discussed with the patient the potential side effects of opioids including but not limited to addiction, altered mental status, and depression. I stressed avoiding alcohol, benzodiazepines, muscle relaxants and sleep aids unless specifically okayed by a physician. The patient verbalized understanding of the proper use and possible adverse effects of opioids. All of the patient's questions and concerns were addressed. They were instructed to flush the remaining pills down the toilet if they did not need them for pain.

## 2024-02-29 ENCOUNTER — TRANSCRIPTION ENCOUNTER (OUTPATIENT)
Age: 34
End: 2024-02-29

## 2025-05-27 NOTE — ASU DISCHARGE PLAN (ADULT/PEDIATRIC). - =======================================================================
Called wants to know whether the provider received the medication changes that was faxed on 05/20/2025.    Requesting a call back    Best call back #[phone]   
Statement Selected

## 2025-06-19 NOTE — ED ADULT NURSE NOTE - CHIEF COMPLAINT QUOTE
No
Pt walk in complaining of cough congestion body aches and subj fevers for the last two weeks. In the last two days pt endorses "leg swelling and heaviness" with dark urine. Denies recent workouts or strenuous activity.